# Patient Record
Sex: FEMALE | Race: WHITE | NOT HISPANIC OR LATINO | URBAN - METROPOLITAN AREA
[De-identification: names, ages, dates, MRNs, and addresses within clinical notes are randomized per-mention and may not be internally consistent; named-entity substitution may affect disease eponyms.]

---

## 2018-02-12 ENCOUNTER — INPATIENT (INPATIENT)
Facility: HOSPITAL | Age: 48
LOS: 3 days | Discharge: HOME | DRG: 472 | End: 2018-02-16
Attending: NEUROLOGICAL SURGERY | Admitting: NEUROLOGICAL SURGERY
Payer: COMMERCIAL

## 2018-02-12 ENCOUNTER — APPOINTMENT (OUTPATIENT)
Dept: SPINE | Facility: CLINIC | Age: 48
End: 2018-02-12
Payer: OTHER MISCELLANEOUS

## 2018-02-12 VITALS
OXYGEN SATURATION: 95 % | HEIGHT: 61 IN | SYSTOLIC BLOOD PRESSURE: 130 MMHG | WEIGHT: 160.06 LBS | HEART RATE: 95 BPM | RESPIRATION RATE: 16 BRPM | DIASTOLIC BLOOD PRESSURE: 94 MMHG | TEMPERATURE: 98 F

## 2018-02-12 VITALS
SYSTOLIC BLOOD PRESSURE: 133 MMHG | BODY MASS INDEX: 30.21 KG/M2 | WEIGHT: 160 LBS | HEIGHT: 61 IN | DIASTOLIC BLOOD PRESSURE: 89 MMHG | HEART RATE: 91 BPM

## 2018-02-12 DIAGNOSIS — G95.9 DISEASE OF SPINAL CORD, UNSPECIFIED: ICD-10-CM

## 2018-02-12 DIAGNOSIS — M54.10 RADICULOPATHY, SITE UNSPECIFIED: ICD-10-CM

## 2018-02-12 DIAGNOSIS — J84.116 CRYPTOGENIC ORGANIZING PNEUMONIA: ICD-10-CM

## 2018-02-12 LAB
ALBUMIN SERPL ELPH-MCNC: 3.8 G/DL — SIGNIFICANT CHANGE UP (ref 3.3–5)
ALP SERPL-CCNC: 46 U/L — SIGNIFICANT CHANGE UP (ref 40–120)
ALT FLD-CCNC: 10 U/L RC — SIGNIFICANT CHANGE UP (ref 10–45)
ANION GAP SERPL CALC-SCNC: 14 MMOL/L — SIGNIFICANT CHANGE UP (ref 5–17)
APTT BLD: 27.8 SEC — SIGNIFICANT CHANGE UP (ref 27.5–37.4)
AST SERPL-CCNC: 12 U/L — SIGNIFICANT CHANGE UP (ref 10–40)
BASOPHILS # BLD AUTO: 0.1 K/UL — SIGNIFICANT CHANGE UP (ref 0–0.2)
BASOPHILS NFR BLD AUTO: 0.5 % — SIGNIFICANT CHANGE UP (ref 0–2)
BILIRUB SERPL-MCNC: 0.1 MG/DL — LOW (ref 0.2–1.2)
BUN SERPL-MCNC: 17 MG/DL — SIGNIFICANT CHANGE UP (ref 7–23)
CALCIUM SERPL-MCNC: 9.3 MG/DL — SIGNIFICANT CHANGE UP (ref 8.4–10.5)
CHLORIDE SERPL-SCNC: 101 MMOL/L — SIGNIFICANT CHANGE UP (ref 96–108)
CO2 SERPL-SCNC: 24 MMOL/L — SIGNIFICANT CHANGE UP (ref 22–31)
CREAT SERPL-MCNC: 0.73 MG/DL — SIGNIFICANT CHANGE UP (ref 0.5–1.3)
ELLIPTOCYTES BLD QL SMEAR: SLIGHT — SIGNIFICANT CHANGE UP
EOSINOPHIL # BLD AUTO: 0.1 K/UL — SIGNIFICANT CHANGE UP (ref 0–0.5)
EOSINOPHIL NFR BLD AUTO: 0.6 % — SIGNIFICANT CHANGE UP (ref 0–6)
GLUCOSE SERPL-MCNC: 94 MG/DL — SIGNIFICANT CHANGE UP (ref 70–99)
HCT VFR BLD CALC: 36.7 % — SIGNIFICANT CHANGE UP (ref 34.5–45)
HGB BLD-MCNC: 12.3 G/DL — SIGNIFICANT CHANGE UP (ref 11.5–15.5)
INR BLD: 0.92 RATIO — SIGNIFICANT CHANGE UP (ref 0.88–1.16)
LYMPHOCYTES # BLD AUTO: 24.3 % — SIGNIFICANT CHANGE UP (ref 13–44)
LYMPHOCYTES # BLD AUTO: 3.9 K/UL — HIGH (ref 1–3.3)
MACROCYTES BLD QL: SIGNIFICANT CHANGE UP
MCHC RBC-ENTMCNC: 33.6 GM/DL — SIGNIFICANT CHANGE UP (ref 32–36)
MCHC RBC-ENTMCNC: 38.5 PG — HIGH (ref 27–34)
MCV RBC AUTO: 114 FL — HIGH (ref 80–100)
MONOCYTES # BLD AUTO: 1 K/UL — HIGH (ref 0–0.9)
MONOCYTES NFR BLD AUTO: 6.1 % — SIGNIFICANT CHANGE UP (ref 2–14)
NEUTROPHILS # BLD AUTO: 11.1 K/UL — HIGH (ref 1.8–7.4)
NEUTROPHILS NFR BLD AUTO: 68.5 % — SIGNIFICANT CHANGE UP (ref 43–77)
OVALOCYTES BLD QL SMEAR: SLIGHT — SIGNIFICANT CHANGE UP
PLAT MORPH BLD: NORMAL — SIGNIFICANT CHANGE UP
PLATELET # BLD AUTO: 365 K/UL — SIGNIFICANT CHANGE UP (ref 150–400)
POIKILOCYTOSIS BLD QL AUTO: SIGNIFICANT CHANGE UP
POTASSIUM SERPL-MCNC: 4 MMOL/L — SIGNIFICANT CHANGE UP (ref 3.5–5.3)
POTASSIUM SERPL-SCNC: 4 MMOL/L — SIGNIFICANT CHANGE UP (ref 3.5–5.3)
PROT SERPL-MCNC: 6.8 G/DL — SIGNIFICANT CHANGE UP (ref 6–8.3)
PROTHROM AB SERPL-ACNC: 9.9 SEC — SIGNIFICANT CHANGE UP (ref 9.8–12.7)
RBC # BLD: 3.21 M/UL — LOW (ref 3.8–5.2)
RBC # FLD: 13.4 % — SIGNIFICANT CHANGE UP (ref 10.3–14.5)
RBC BLD AUTO: ABNORMAL
SCHISTOCYTES BLD QL AUTO: SLIGHT — SIGNIFICANT CHANGE UP
SODIUM SERPL-SCNC: 139 MMOL/L — SIGNIFICANT CHANGE UP (ref 135–145)
WBC # BLD: 16.2 K/UL — HIGH (ref 3.8–10.5)
WBC # FLD AUTO: 16.2 K/UL — HIGH (ref 3.8–10.5)

## 2018-02-12 PROCEDURE — 99285 EMERGENCY DEPT VISIT HI MDM: CPT

## 2018-02-12 PROCEDURE — 99205 OFFICE O/P NEW HI 60 MIN: CPT

## 2018-02-12 PROCEDURE — 72125 CT NECK SPINE W/O DYE: CPT | Mod: 26

## 2018-02-12 RX ORDER — NORTRIPTYLINE HYDROCHLORIDE 10 MG/1
50 CAPSULE ORAL DAILY
Qty: 0 | Refills: 0 | Status: DISCONTINUED | OUTPATIENT
Start: 2018-02-12 | End: 2018-02-14

## 2018-02-12 RX ORDER — SODIUM CHLORIDE 9 MG/ML
1000 INJECTION INTRAMUSCULAR; INTRAVENOUS; SUBCUTANEOUS ONCE
Qty: 0 | Refills: 0 | Status: COMPLETED | OUTPATIENT
Start: 2018-02-12 | End: 2018-02-12

## 2018-02-12 RX ORDER — ACETAMINOPHEN 500 MG
650 TABLET ORAL EVERY 6 HOURS
Qty: 0 | Refills: 0 | Status: DISCONTINUED | OUTPATIENT
Start: 2018-02-12 | End: 2018-02-14

## 2018-02-12 RX ORDER — FLUOXETINE HCL 10 MG
40 CAPSULE ORAL DAILY
Qty: 0 | Refills: 0 | Status: DISCONTINUED | OUTPATIENT
Start: 2018-02-12 | End: 2018-02-14

## 2018-02-12 RX ORDER — ALPRAZOLAM 0.25 MG
1 TABLET ORAL
Qty: 0 | Refills: 0 | COMMUNITY

## 2018-02-12 RX ORDER — NORTRIPTYLINE HYDROCHLORIDE 10 MG/1
40 CAPSULE ORAL
Qty: 0 | Refills: 0 | COMMUNITY

## 2018-02-12 RX ORDER — PROPRANOLOL HCL 160 MG
60 CAPSULE, EXTENDED RELEASE 24HR ORAL DAILY
Qty: 0 | Refills: 0 | Status: DISCONTINUED | OUTPATIENT
Start: 2018-02-12 | End: 2018-02-14

## 2018-02-12 RX ORDER — PROPRANOLOL HCL 160 MG
1 CAPSULE, EXTENDED RELEASE 24HR ORAL
Qty: 0 | Refills: 0 | COMMUNITY

## 2018-02-12 RX ORDER — PREGABALIN 100 MG/1
100 CAPSULE ORAL
Refills: 0 | Status: ACTIVE | COMMUNITY

## 2018-02-12 RX ORDER — INFLUENZA VIRUS VACCINE 15; 15; 15; 15 UG/.5ML; UG/.5ML; UG/.5ML; UG/.5ML
0.5 SUSPENSION INTRAMUSCULAR ONCE
Qty: 0 | Refills: 0 | Status: DISCONTINUED | OUTPATIENT
Start: 2018-02-12 | End: 2018-02-16

## 2018-02-12 RX ORDER — HYDROCODONE BITARTRATE AND ACETAMINOPHEN 10; 325 MG/1; MG/1
10-325 TABLET ORAL
Refills: 0 | Status: ACTIVE | COMMUNITY

## 2018-02-12 RX ORDER — HYDROMORPHONE HYDROCHLORIDE 2 MG/ML
2 INJECTION INTRAMUSCULAR; INTRAVENOUS; SUBCUTANEOUS EVERY 4 HOURS
Qty: 0 | Refills: 0 | Status: DISCONTINUED | OUTPATIENT
Start: 2018-02-12 | End: 2018-02-14

## 2018-02-12 RX ORDER — OXYCODONE HYDROCHLORIDE 5 MG/1
10 TABLET ORAL EVERY 4 HOURS
Qty: 0 | Refills: 0 | Status: DISCONTINUED | OUTPATIENT
Start: 2018-02-12 | End: 2018-02-14

## 2018-02-12 RX ORDER — MORPHINE SULFATE 50 MG/1
4 CAPSULE, EXTENDED RELEASE ORAL ONCE
Qty: 0 | Refills: 0 | Status: DISCONTINUED | OUTPATIENT
Start: 2018-02-12 | End: 2018-02-12

## 2018-02-12 RX ORDER — PROPRANOLOL HCL 160 MG
0 CAPSULE, EXTENDED RELEASE 24HR ORAL
Qty: 0 | Refills: 0 | COMMUNITY

## 2018-02-12 RX ORDER — SENNA PLUS 8.6 MG/1
2 TABLET ORAL AT BEDTIME
Qty: 0 | Refills: 0 | Status: DISCONTINUED | OUTPATIENT
Start: 2018-02-12 | End: 2018-02-14

## 2018-02-12 RX ORDER — PROPRANOLOL HYDROCHLORIDE 60 MG/1
60 TABLET ORAL
Refills: 0 | Status: ACTIVE | COMMUNITY

## 2018-02-12 RX ORDER — NORTRIPTYLINE HYDROCHLORIDE 10 MG/1
10 CAPSULE ORAL
Refills: 0 | Status: ACTIVE | COMMUNITY

## 2018-02-12 RX ORDER — HYDROMORPHONE HYDROCHLORIDE 2 MG/ML
0.5 INJECTION INTRAMUSCULAR; INTRAVENOUS; SUBCUTANEOUS EVERY 4 HOURS
Qty: 0 | Refills: 0 | Status: DISCONTINUED | OUTPATIENT
Start: 2018-02-12 | End: 2018-02-14

## 2018-02-12 RX ORDER — FLUOXETINE HCL 10 MG
1 CAPSULE ORAL
Qty: 0 | Refills: 0 | COMMUNITY

## 2018-02-12 RX ORDER — ALPRAZOLAM 0.25 MG
0.25 TABLET ORAL
Qty: 0 | Refills: 0 | Status: DISCONTINUED | OUTPATIENT
Start: 2018-02-12 | End: 2018-02-14

## 2018-02-12 RX ORDER — ONDANSETRON 8 MG/1
4 TABLET, FILM COATED ORAL EVERY 6 HOURS
Qty: 0 | Refills: 0 | Status: DISCONTINUED | OUTPATIENT
Start: 2018-02-12 | End: 2018-02-14

## 2018-02-12 RX ADMIN — Medication 20 MILLIGRAM(S): at 19:52

## 2018-02-12 RX ADMIN — MORPHINE SULFATE 4 MILLIGRAM(S): 50 CAPSULE, EXTENDED RELEASE ORAL at 18:34

## 2018-02-12 RX ADMIN — Medication 0.25 MILLIGRAM(S): at 19:52

## 2018-02-12 RX ADMIN — HYDROMORPHONE HYDROCHLORIDE 2 MILLIGRAM(S): 2 INJECTION INTRAMUSCULAR; INTRAVENOUS; SUBCUTANEOUS at 23:42

## 2018-02-12 RX ADMIN — Medication 100 MILLIGRAM(S): at 19:52

## 2018-02-12 RX ADMIN — SODIUM CHLORIDE 2000 MILLILITER(S): 9 INJECTION INTRAMUSCULAR; INTRAVENOUS; SUBCUTANEOUS at 18:34

## 2018-02-12 NOTE — H&P ADULT - HISTORY OF PRESENT ILLNESS
p ([id number])     HPI: 49 yo female with 2 week history of significant LUE radiculopathy and numbness.  Denies any recent trauma, weakness. PMH positive for cryptogenic organizing PNA. MRI showed disc herniation from C3-4 to C6-C7.    PAST MEDICAL HISTORY   Cryptogenic organizing pneumonia  Herniated disc, cervical    PAST SURGICAL HISTORY         MEDICATIONS:  Antibiotics:    Neuro:  morphine  - Injectable 4 milliGRAM(s) IV Push Once    Anticoagulation:    Other:  sodium chloride 0.9% Bolus 1000 milliLiter(s) IV Bolus once      SOCIAL HISTORY:   Occupation:   Marital Status:     FAMILY HISTORY:      REVIEW OF SYSTEMS:  Check here if all are normal other than Neurological [x]  General:  Eyes:  ENT:  Cardiac:  Respiratory:  GI:  Musculoskeletal:   Skin:  Neurologic:   Psychiatric:     PHYSICAL EXAMINATION:   T(C): 36.7 (02-12-18 @ 15:01), Max: 36.7 (02-12-18 @ 15:01)  HR: 95 (02-12-18 @ 15:01) (95 - 95)  BP: 130/94 (02-12-18 @ 15:01) (130/94 - 130/94)  RR: 16 (02-12-18 @ 15:01) (16 - 16)  SpO2: 95% (02-12-18 @ 15:01) (95% - 95%)  Wt(kg): --Height (cm): 154.94 (02-12 @ 15:01)  Weight (kg): 72.6 (02-12 @ 15:01)    General Examination:     Neurologic Examination:               LABS:                RADIOLOGY & ADDITIONAL STUDIES:

## 2018-02-12 NOTE — ED PROVIDER NOTE - MEDICAL DECISION MAKING DETAILS
Patient presents w neck pain and paresthesia. Pt w decreased sensation on exam. Seen by Neurosurgery discussed surgical options. Pain control at this time and follow up recs.

## 2018-02-12 NOTE — H&P ADULT - ASSESSMENT
49 yo female with LUE radiculopathy for the past 2 weeks.  MRI showed disc herniation from C3-4 to C6-C7.    -admit to [place]  -ct c spine

## 2018-02-12 NOTE — ED PROVIDER NOTE - OBJECTIVE STATEMENT
49 yo f with pmhx of cryptogenic organizing pneumonia and herniated disc presents with "excruciating" neck pain and numbness to fingers on left hand, as well as headache. Pt was at [facility] last Monday where she got an MRI that showed 4 bulging discs. Denies any incontinence. 49 yo f with pmhx of cryptogenic organizing pneumonia and herniated disc presents with "excruciating" neck pain and numbness to fingers on left hand, as well as headache. Denies any trauma, states she woke up w symptoms not sure if she slept a certain way. Pt was at [facility] [place] where she got an MRI that showed 4 bulging discs and offered surgery but pt declined and wanted to follow up with [facility] physicians. Pt saw Dr. Lowe today who also discussed surgery w her but the pain was so severe she came into the ED. Denies any weakness or change in bladder/bowel, incontinence. no fever or chills.

## 2018-02-12 NOTE — ED ADULT NURSE NOTE - OBJECTIVE STATEMENT
48y f pt c/o "excruciating" neck pain and numbness to all fingers on left hand; pt states fell asleep about 2 weeks ago and woke with this; pt went to [facility] last week; was given an mri and dx with 4 cervical bulging discs; pt stated left hospital and met with md regarding operating on discs; pt also c/o headache; aox3; no resp distress; pt has hx of trach, healed; no chest pain; no abd pain; no n/v/d; no fever/chills; no incontinence; no diff ambulating; no other visible neuro deficits; skin warm dry intact; iv placed; labs drawn; pt medicated per md order; safety/comfort maintained; spouse at bedside

## 2018-02-13 DIAGNOSIS — F32.9 MAJOR DEPRESSIVE DISORDER, SINGLE EPISODE, UNSPECIFIED: ICD-10-CM

## 2018-02-13 DIAGNOSIS — M54.12 RADICULOPATHY, CERVICAL REGION: ICD-10-CM

## 2018-02-13 DIAGNOSIS — J84.10 PULMONARY FIBROSIS, UNSPECIFIED: ICD-10-CM

## 2018-02-13 DIAGNOSIS — J84.9 INTERSTITIAL PULMONARY DISEASE, UNSPECIFIED: ICD-10-CM

## 2018-02-13 DIAGNOSIS — J98.4 OTHER DISORDERS OF LUNG: ICD-10-CM

## 2018-02-13 DIAGNOSIS — J84.116 CRYPTOGENIC ORGANIZING PNEUMONIA: ICD-10-CM

## 2018-02-13 DIAGNOSIS — Z29.9 ENCOUNTER FOR PROPHYLACTIC MEASURES, UNSPECIFIED: ICD-10-CM

## 2018-02-13 DIAGNOSIS — D75.89 OTHER SPECIFIED DISEASES OF BLOOD AND BLOOD-FORMING ORGANS: ICD-10-CM

## 2018-02-13 LAB
GAS PNL BLDA: SIGNIFICANT CHANGE UP
HCG SERPL-ACNC: <2 MIU/ML — LOW (ref 5–24)
HCT VFR BLD CALC: 35.9 % — SIGNIFICANT CHANGE UP (ref 34.5–45)
HGB BLD-MCNC: 11.7 G/DL — SIGNIFICANT CHANGE UP (ref 11.5–15.5)
MCHC RBC-ENTMCNC: 32.6 GM/DL — SIGNIFICANT CHANGE UP (ref 32–36)
MCHC RBC-ENTMCNC: 37 PG — HIGH (ref 27–34)
MCV RBC AUTO: 113.6 FL — HIGH (ref 80–100)
PLATELET # BLD AUTO: 394 K/UL — SIGNIFICANT CHANGE UP (ref 150–400)
RBC # BLD: 3.16 M/UL — LOW (ref 3.8–5.2)
RBC # FLD: 15.1 % — HIGH (ref 10.3–14.5)
RHEUMATOID FACT SERPL-ACNC: <7 IU/ML — SIGNIFICANT CHANGE UP (ref 0–13.9)
WBC # BLD: 13.47 K/UL — HIGH (ref 3.8–10.5)
WBC # FLD AUTO: 13.47 K/UL — HIGH (ref 3.8–10.5)

## 2018-02-13 PROCEDURE — 99233 SBSQ HOSP IP/OBS HIGH 50: CPT | Mod: 57

## 2018-02-13 PROCEDURE — 99223 1ST HOSP IP/OBS HIGH 75: CPT

## 2018-02-13 PROCEDURE — 71250 CT THORAX DX C-: CPT | Mod: 26

## 2018-02-13 PROCEDURE — 93306 TTE W/DOPPLER COMPLETE: CPT | Mod: 26

## 2018-02-13 RX ORDER — CYCLOBENZAPRINE HYDROCHLORIDE 10 MG/1
10 TABLET, FILM COATED ORAL THREE TIMES A DAY
Qty: 0 | Refills: 0 | Status: DISCONTINUED | OUTPATIENT
Start: 2018-02-13 | End: 2018-02-14

## 2018-02-13 RX ORDER — IPRATROPIUM/ALBUTEROL SULFATE 18-103MCG
3 AEROSOL WITH ADAPTER (GRAM) INHALATION EVERY 6 HOURS
Qty: 0 | Refills: 0 | Status: DISCONTINUED | OUTPATIENT
Start: 2018-02-13 | End: 2018-02-14

## 2018-02-13 RX ORDER — HYDROMORPHONE HYDROCHLORIDE 2 MG/ML
0.5 INJECTION INTRAMUSCULAR; INTRAVENOUS; SUBCUTANEOUS ONCE
Qty: 0 | Refills: 0 | Status: DISCONTINUED | OUTPATIENT
Start: 2018-02-13 | End: 2018-02-14

## 2018-02-13 RX ADMIN — HYDROMORPHONE HYDROCHLORIDE 0.5 MILLIGRAM(S): 2 INJECTION INTRAMUSCULAR; INTRAVENOUS; SUBCUTANEOUS at 08:51

## 2018-02-13 RX ADMIN — Medication 40 MILLIGRAM(S): at 11:15

## 2018-02-13 RX ADMIN — HYDROMORPHONE HYDROCHLORIDE 2 MILLIGRAM(S): 2 INJECTION INTRAMUSCULAR; INTRAVENOUS; SUBCUTANEOUS at 20:50

## 2018-02-13 RX ADMIN — HYDROMORPHONE HYDROCHLORIDE 0.5 MILLIGRAM(S): 2 INJECTION INTRAMUSCULAR; INTRAVENOUS; SUBCUTANEOUS at 15:24

## 2018-02-13 RX ADMIN — NORTRIPTYLINE HYDROCHLORIDE 50 MILLIGRAM(S): 10 CAPSULE ORAL at 06:53

## 2018-02-13 RX ADMIN — Medication 3 MILLILITER(S): at 17:33

## 2018-02-13 RX ADMIN — Medication 10 MILLIGRAM(S): at 17:29

## 2018-02-13 RX ADMIN — OXYCODONE HYDROCHLORIDE 10 MILLIGRAM(S): 5 TABLET ORAL at 16:13

## 2018-02-13 RX ADMIN — OXYCODONE HYDROCHLORIDE 10 MILLIGRAM(S): 5 TABLET ORAL at 11:15

## 2018-02-13 RX ADMIN — Medication 60 MILLIGRAM(S): at 11:14

## 2018-02-13 RX ADMIN — Medication 100 MILLIGRAM(S): at 17:29

## 2018-02-13 RX ADMIN — Medication 0.25 MILLIGRAM(S): at 06:53

## 2018-02-13 RX ADMIN — HYDROMORPHONE HYDROCHLORIDE 0.5 MILLIGRAM(S): 2 INJECTION INTRAMUSCULAR; INTRAVENOUS; SUBCUTANEOUS at 09:05

## 2018-02-13 RX ADMIN — CYCLOBENZAPRINE HYDROCHLORIDE 10 MILLIGRAM(S): 10 TABLET, FILM COATED ORAL at 17:34

## 2018-02-13 RX ADMIN — HYDROMORPHONE HYDROCHLORIDE 0.5 MILLIGRAM(S): 2 INJECTION INTRAMUSCULAR; INTRAVENOUS; SUBCUTANEOUS at 23:20

## 2018-02-13 RX ADMIN — Medication 100 MILLIGRAM(S): at 06:54

## 2018-02-13 RX ADMIN — OXYCODONE HYDROCHLORIDE 10 MILLIGRAM(S): 5 TABLET ORAL at 11:45

## 2018-02-13 RX ADMIN — OXYCODONE HYDROCHLORIDE 10 MILLIGRAM(S): 5 TABLET ORAL at 04:41

## 2018-02-13 RX ADMIN — HYDROMORPHONE HYDROCHLORIDE 2 MILLIGRAM(S): 2 INJECTION INTRAMUSCULAR; INTRAVENOUS; SUBCUTANEOUS at 00:58

## 2018-02-13 RX ADMIN — HYDROMORPHONE HYDROCHLORIDE 0.5 MILLIGRAM(S): 2 INJECTION INTRAMUSCULAR; INTRAVENOUS; SUBCUTANEOUS at 15:40

## 2018-02-13 RX ADMIN — Medication 0.25 MILLIGRAM(S): at 17:30

## 2018-02-13 RX ADMIN — HYDROMORPHONE HYDROCHLORIDE 0.5 MILLIGRAM(S): 2 INJECTION INTRAMUSCULAR; INTRAVENOUS; SUBCUTANEOUS at 01:44

## 2018-02-13 RX ADMIN — HYDROMORPHONE HYDROCHLORIDE 0.5 MILLIGRAM(S): 2 INJECTION INTRAMUSCULAR; INTRAVENOUS; SUBCUTANEOUS at 22:55

## 2018-02-13 RX ADMIN — Medication 10 MILLIGRAM(S): at 11:14

## 2018-02-13 RX ADMIN — HYDROMORPHONE HYDROCHLORIDE 0.5 MILLIGRAM(S): 2 INJECTION INTRAMUSCULAR; INTRAVENOUS; SUBCUTANEOUS at 03:03

## 2018-02-13 RX ADMIN — HYDROMORPHONE HYDROCHLORIDE 2 MILLIGRAM(S): 2 INJECTION INTRAMUSCULAR; INTRAVENOUS; SUBCUTANEOUS at 21:30

## 2018-02-13 RX ADMIN — OXYCODONE HYDROCHLORIDE 10 MILLIGRAM(S): 5 TABLET ORAL at 07:12

## 2018-02-13 NOTE — CONSULT NOTE ADULT - SUBJECTIVE AND OBJECTIVE BOX
Dr Diony Vazquez     PMD: Hernandez Hopkins    Patient is a 48y old  Female who presents with a chief complaint of LUE radiculopathy (12 Feb 2018 18:11)      HPI:  49 yo female with 2 week history of significant LUE radiculopathy and numbness.  Denies any recent trauma, weakness. PMH positive for cryptogenic organizing PNA. MRI showed disc herniation from C3-4 to C6-C7.      History limited due to: [ ] Dementia  [ ] Delirium  [ ] Condition    Pain Symptoms if applicable:             	                         none	   mild         moderate         severe  Pain:	                            0	    1-3	     4-6	         7-10  Location:	  Modifying factors:	  Associated symptoms:	    Function: [ ] Independent  [ ] Assistance  [ ] Total care  [ ] Non-ambulatory  On antiplatelet or anticoagulation? [ ] YES [ ] NO  Prior anesthesia:  DASI: METS:       Allergies    No Known Allergies    Intolerances      HOME MEDICATIONS: [ ] Reviewed  Home Medications:  Lyrica: 100 milligram(s) orally 3 times a day (12 Feb 2018 20:06)  Lyrica:  (12 Feb 2018 20:06)  Norco:  (12 Feb 2018 20:06)  Norco 10 mg-325 mg oral tablet: 1 tab(s) orally every 6 hours (12 Feb 2018 20:06)  nortriptyline:  (12 Feb 2018 20:06)  nortriptyline: 40 milligram(s) orally once a day (12 Feb 2018 20:06)  predniSONE:  (12 Feb 2018 20:06)  predniSONE 20 mg oral tablet: 1 tab(s) orally once a day (12 Feb 2018 20:06)  propranolol:  (12 Feb 2018 20:06)  propranolol 60 mg oral capsule, extended release: 1 cap(s) orally once a day (12 Feb 2018 20:06)  PROzac:  (12 Feb 2018 20:06)  PROzac 40 mg oral capsule: 1 cap(s) orally once a day (12 Feb 2018 20:06)  Soma: 350 milligram(s) orally 2 times a day (12 Feb 2018 20:06)  Soma:  (12 Feb 2018 20:06)  Xanax:  (12 Feb 2018 20:06)  Xanax 0.25 mg oral tablet: 1 cap(s) orally 2 times a day (12 Feb 2018 20:06)      MEDICATIONS  (STANDING):  ALPRAZolam 0.25 milliGRAM(s) Oral two times a day  FLUoxetine 40 milliGRAM(s) Oral daily  influenza   Vaccine 0.5 milliLiter(s) IntraMuscular once  nortriptyline 50 milliGRAM(s) Oral daily  predniSONE   Tablet 10 milliGRAM(s) Oral two times a day  pregabalin 100 milliGRAM(s) Oral three times a day  propranolol LA 60 milliGRAM(s) Oral daily    MEDICATIONS  (PRN):  acetaminophen   Tablet. 650 milliGRAM(s) Oral every 6 hours PRN Mild Pain (1 - 3)  bisacodyl 5 milliGRAM(s) Oral daily PRN Constipation  cyclobenzaprine 10 milliGRAM(s) Oral three times a day PRN Muscle Spasm  HYDROmorphone   Tablet 2 milliGRAM(s) Oral every 4 hours PRN Severe Pain (7 - 10)  HYDROmorphone  Injectable 0.5 milliGRAM(s) IV Push every 4 hours PRN breakthrough  ondansetron   Disintegrating Tablet 4 milliGRAM(s) Oral every 6 hours PRN Nausea  oxyCODONE    IR 10 milliGRAM(s) Oral every 4 hours PRN Moderate Pain (4 - 6)  senna 2 Tablet(s) Oral at bedtime PRN Constipation      PAST MEDICAL & SURGICAL HISTORY:  Cryptogenic organizing pneumonia: chest tube/trach  Herniated disc, cervical  [ ] Reviewed     SOCIAL HISTORY:  Residence: [ ] [facility]  [ ] [facility]  [ ] Community  [ ] Substance abuse:   [ ] Tobacco:   [ ] Alcohol use:     FAMILY HISTORY:  [ ] No pertinent family history in first degree relatives     REVIEW OF SYSTEMS:    CONSTITUTIONAL: No fever, weight loss, or fatigue  EYES: No eye pain, visual disturbances, or discharge  ENMT:  No difficulty hearing, tinnitus, vertigo; No sinus or throat pain  NECK: No pain or stiffness  BREASTS: No pain, masses, or nipple discharge  RESPIRATORY: No cough, wheezing, chills or hemoptysis; No shortness of breath  CARDIOVASCULAR: No chest pain, palpitations, dizziness, or leg swelling  GASTROINTESTINAL: No abdominal or epigastric pain. No nausea, vomiting, or hematemesis; No diarrhea or constipation. No melena or hematochezia.  GENITOURINARY: No dysuria, frequency, hematuria, or incontinence  NEUROLOGICAL: No headaches, memory loss, loss of strength, numbness, or tremors  SKIN: No itching, burning, rashes, or lesions   LYMPH NODES: No enlarged glands  ENDOCRINE: No heat or cold intolerance; No hair loss  MUSCULOSKELETAL: No muscle or back pain  PSYCHIATRIC: No depression, anxiety, mood swings, or difficulty sleeping  HEME/LYMPH: No easy bruising, or bleeding gums  ALLERGY AND IMMUNOLOGIC: No hives or eczema    [  ] All other ROS negative  [  ] Unable to obtain due to poor mental status    Vital Signs Last 24 Hrs  T(C): 36.7 (13 Feb 2018 06:54), Max: 36.8 (12 Feb 2018 17:30)  T(F): 98.1 (13 Feb 2018 06:54), Max: 98.2 (12 Feb 2018 17:30)  HR: 98 (13 Feb 2018 06:54) (85 - 98)  BP: 145/90 (13 Feb 2018 06:54) (130/94 - 159/92)  BP(mean): --  RR: 18 (13 Feb 2018 06:54) (16 - 18)  SpO2: 95% (13 Feb 2018 06:54) (95% - 97%)    PHYSICAL EXAM:    GENERAL: NAD, well-groomed, well-developed  HEAD:  Atraumatic, Normocephalic  EYES: EOMI, PERRLA, conjunctiva and sclera clear  ENMT: Moist mucous membranes  NECK: Supple, No JVD  RESPIRATORY: Clear to auscultation bilaterally; No rales, rhonchi, wheezing, or rubs  CARDIOVASCULAR: Regular rate and rhythm; No murmurs, rubs, or gallops  GASTROINTESTINAL: Soft, Nontender, Nondistended; Bowel sounds present  GENITOURINARY: Not examined  EXTREMITIES:  2+ Peripheral Pulses, No clubbing, cyanosis, or edema  NEURO:  Alert & Oriented X3; Moving all 4 extremities; No gross sensory deficits  HEME/LYMPH: No lymphadenopathy noted  SKIN: No rashes or lesions; Incisions C/D/I    LABS:                        12.3   16.2  )-----------( 365      ( 12 Feb 2018 18:33 )             36.7     02-12    139  |  101  |  17  ----------------------------<  94  4.0   |  24  |  0.73    Ca    9.3      12 Feb 2018 18:33    TPro  6.8  /  Alb  3.8  /  TBili  0.1<L>  /  DBili  x   /  AST  12  /  ALT  10  /  AlkPhos  46  02-12    PT/INR - ( 12 Feb 2018 18:33 )   PT: 9.9 sec;   INR: 0.92 ratio         PTT - ( 12 Feb 2018 18:33 )  PTT:27.8 sec    CAPILLARY BLOOD GLUCOSE            RADIOLOGY & ADDITIONAL STUDIES:    EKG:   Personally Reviewed:  [X] YES NSR somewhat low voltage no ischemic changes    Imaging:   Personally Reviewed:  [X] YES < from: CT Cervical Spine No Cont (02.12.18 @ 19:21) >  No evidence for acute fracture. Degenerative changes as described. MRI   may be obtained for further evaluation as warranted if there are no MRI   contraindications.    < end of copied text >                Consultant(s) notes reviewed:    Care Discussed with Consultant(s)/Other Providers:    Advanced Directives: [ ] DNR  [ ] No feeding tube  [ ] MOLST in chart  [ ] MOLST completed today  [ ] Unknown    [ ] Probable osteoporosis  [ ] Possible osteomalacia  [ ] Increased delirium risk  [ ] Delirium and other risks can be reduced by:          -early ambulation          -minimizing "tethers" - IV, oxygen, catheters, etc          -avoiding hypnotics and sedatives          -maintaining hydration/nutrition          -avoid anticholinergics - diphenhydramine, etc          -pain control          -supportive environment Dr Diony Vazquez     PMD: Hernandez Hopkins // [facility] - [place] ([facility]     Patient is a 48y old  Female who presents with a chief complaint of LUE radiculopathy (12 Feb 2018 18:11)    HPI:  48F with cryptogenic organizing pneumonia x 5 yrs w/multiple hospitalizations for pneumonia requiring weeks-long intubations most recently 12/2016, h/o trach s/p decannulation, on chronic steroids, chronic back pain p/w 2 week history of significant LUE radiculopathy and numbness.  Denies any recent trauma, weakness. MRI showed disc herniation from C3-4 to C6-C7.    Pain Symptoms if applicable:  Pain:	moderate  Location:	neck  Modifying factors:	extensive pain regimen w/inadequate relief. no dysphagia.   Associated symptoms:	radiation to L hand w/numbness    Function: [ ] Independent  [X] Assistance  [ ] Total care  [ ] Non-ambulatory  On antiplatelet or anticoagulation? [ ] YES [X] NO  Prior anesthesia: yes  METS: limited for last 5 yrs due to       Allergies    No Known Allergies    Intolerances      HOME MEDICATIONS: [X] Reviewed ISTOP reviewed [id number]  Home Medications:  Lyrica: 100 milligram(s) orally 3 times a day (12 Feb 2018 20:06)  Lyrica:  (12 Feb 2018 20:06)  Norco:  (12 Feb 2018 20:06)  Norco 10 mg-325 mg oral tablet: 1 tab(s) orally every 6 hours (12 Feb 2018 20:06)  nortriptyline:  (12 Feb 2018 20:06)  nortriptyline: 40 milligram(s) orally once a day (12 Feb 2018 20:06)  predniSONE:  (12 Feb 2018 20:06)  predniSONE 20 mg oral tablet: 1 tab(s) orally once a day (12 Feb 2018 20:06)  propranolol:  (12 Feb 2018 20:06)  propranolol 60 mg oral capsule, extended release: 1 cap(s) orally once a day (12 Feb 2018 20:06)  PROzac:  (12 Feb 2018 20:06)  PROzac 40 mg oral capsule: 1 cap(s) orally once a day (12 Feb 2018 20:06)  Soma: 350 milligram(s) orally 2 times a day (12 Feb 2018 20:06)  Soma:  (12 Feb 2018 20:06)  Xanax:  (12 Feb 2018 20:06)  Xanax 0.25 mg oral tablet: 1 cap(s) orally 2 times a day (12 Feb 2018 20:06)      MEDICATIONS  (STANDING):  ALPRAZolam 0.25 milliGRAM(s) Oral two times a day  FLUoxetine 40 milliGRAM(s) Oral daily  influenza   Vaccine 0.5 milliLiter(s) IntraMuscular once  nortriptyline 50 milliGRAM(s) Oral daily  predniSONE   Tablet 10 milliGRAM(s) Oral two times a day  pregabalin 100 milliGRAM(s) Oral three times a day  propranolol LA 60 milliGRAM(s) Oral daily    MEDICATIONS  (PRN):  acetaminophen   Tablet. 650 milliGRAM(s) Oral every 6 hours PRN Mild Pain (1 - 3)  bisacodyl 5 milliGRAM(s) Oral daily PRN Constipation  cyclobenzaprine 10 milliGRAM(s) Oral three times a day PRN Muscle Spasm  HYDROmorphone   Tablet 2 milliGRAM(s) Oral every 4 hours PRN Severe Pain (7 - 10)  HYDROmorphone  Injectable 0.5 milliGRAM(s) IV Push every 4 hours PRN breakthrough  ondansetron   Disintegrating Tablet 4 milliGRAM(s) Oral every 6 hours PRN Nausea  oxyCODONE    IR 10 milliGRAM(s) Oral every 4 hours PRN Moderate Pain (4 - 6)  senna 2 Tablet(s) Oral at bedtime PRN Constipation      PAST MEDICAL & SURGICAL HISTORY:  Cryptogenic organizing pneumonia: chest tube/trach  Herniated disc, cervical  [X] Reviewed     SOCIAL HISTORY:  Residence: [ ] [facility]  [ ] SNF  [X] Community  [X] Substance abuse: none  [X] Tobacco: none  [X] Alcohol use: none    FAMILY HISTORY:  Mother - Breast Cancer  Father - Heart disease    REVIEW OF SYSTEMS:    CONSTITUTIONAL: No fever, weight loss, or fatigue  EYES: No eye pain, visual disturbances, or discharge  ENMT: No difficulty hearing, tinnitus, vertigo; No sinus or throat pain  NECK: + pain, see above  RESPIRATORY: SOB at baseline  CARDIOVASCULAR: No chest pain, palpitations, dizziness, or leg swelling  GASTROINTESTINAL: No abdominal or epigastric pain. No nausea, vomiting, or hematemesis; No diarrhea or constipation. No melena or hematochezia.  GENITOURINARY: No dysuria, frequency, hematuria, or incontinence  NEUROLOGICAL: L arm numbness, no difficulty walking  SKIN: No itching, burning, rashes, or lesions   MUSCULOSKELETAL: + back pain  PSYCHIATRIC: No depression, anxiety, mood swings, or difficulty sleeping    [ X ] All other ROS negative  [  ] Unable to obtain due to poor mental status    Vital Signs Last 24 Hrs  T(C): 36.7 (13 Feb 2018 06:54), Max: 36.8 (12 Feb 2018 17:30)  T(F): 98.1 (13 Feb 2018 06:54), Max: 98.2 (12 Feb 2018 17:30)  HR: 98 (13 Feb 2018 06:54) (85 - 98)  BP: 145/90 (13 Feb 2018 06:54) (130/94 - 159/92)  BP(mean): --  RR: 18 (13 Feb 2018 06:54) (16 - 18)  SpO2: 95% (13 Feb 2018 06:54) (95% - 97%)    PHYSICAL EXAM:    GENERAL: NAD, well-groomed, well-developed  EYES: EOMI, PERRLA, conjunctiva and sclera clear  ENMT: Moist mucous membranes  NECK: Supple, No JVD. +trach scar  RESPIRATORY: Clear to auscultation bilaterally; No rales, rhonchi, wheezing, or rubs  CARDIOVASCULAR: Regular rate and rhythm; No murmurs, rubs, or gallops  GASTROINTESTINAL: Soft, Nontender, Nondistended; Bowel sounds present  EXTREMITIES:  2+ Peripheral Pulses, No clubbing, cyanosis, or edema  NEURO:  Alert & Oriented X3; Moving all 4 extremities; numbness L 4th/5th fingers, + L side pronator drift  HEME/LYMPH: No lymphadenopathy noted  SKIN: No rashes or lesions; Incisions C/D/I    LABS:                        12.3   16.2  )-----------( 365      ( 12 Feb 2018 18:33 )             36.7     02-12    139  |  101  |  17  ----------------------------<  94  4.0   |  24  |  0.73    Ca    9.3      12 Feb 2018 18:33    TPro  6.8  /  Alb  3.8  /  TBili  0.1<L>  /  DBili  x   /  AST  12  /  ALT  10  /  AlkPhos  46  02-12    PT/INR - ( 12 Feb 2018 18:33 )   PT: 9.9 sec;   INR: 0.92 ratio         PTT - ( 12 Feb 2018 18:33 )  PTT:27.8 sec    CAPILLARY BLOOD GLUCOSE            RADIOLOGY & ADDITIONAL STUDIES:    EKG:   Personally Reviewed:  [X] YES NSR somewhat low voltage no ischemic changes    Imaging:   Personally Reviewed:  [X] YES < from: CT Cervical Spine No Cont (02.12.18 @ 19:21) >  No evidence for acute fracture. Degenerative changes as described. MRI   may be obtained for further evaluation as warranted if there are no MRI   contraindications.    < end of copied text >                Consultant(s) notes reviewed:    Care Discussed with Consultant(s)/Other Providers:    Advanced Directives: [ ] DNR  [ ] No feeding tube  [ ] MOLST in chart  [ ] MOLST completed today  [ ] Unknown    [ ] Probable osteoporosis  [ ] Possible osteomalacia  [ ] Increased delirium risk  [ ] Delirium and other risks can be reduced by:          -early ambulation          -minimizing "tethers" - IV, oxygen, catheters, etc          -avoiding hypnotics and sedatives          -maintaining hydration/nutrition          -avoid anticholinergics - diphenhydramine, etc          -pain control          -supportive environment Dr Diony Vazquez     PMD: Hernandez Hopkins // [facility] [place]     Patient is a 48y old  Female who presents with a chief complaint of LUE radiculopathy (12 Feb 2018 18:11)    HPI:  48F with cryptogenic organizing pneumonia x 5 yrs w/multiple hospitalizations for pneumonia requiring weeks-long intubations most recently 12/2016, h/o trach s/p decannulation, on chronic steroids on home oxygen QHS, chronic back pain p/w 2 week history of significant LUE radiculopathy and numbness.  Denies any recent trauma, weakness. MRI showed disc herniation from C3-4 to C6-C7.   Pt notes she has frequent dyspnea with exertion. She is on propranolol because of tachycardia.     Pain Symptoms if applicable:  Pain:	moderate  Location:	neck  Modifying factors:	extensive pain regimen w/inadequate relief. no dysphagia.   Associated symptoms:	radiation to L hand w/numbness    Function: [ ] Independent  [X] Assistance  [ ] Total care  [ ] Non-ambulatory  On antiplatelet or anticoagulation? [ ] YES [X] NO  Prior anesthesia: yes  METS: limited for last 5 yrs due to       Allergies    No Known Allergies    Intolerances      HOME MEDICATIONS: [X] Reviewed ISTOP reviewed [id number]  Home Medications:  Lyrica: 100 milligram(s) orally 3 times a day (12 Feb 2018 20:06)  Lyrica:  (12 Feb 2018 20:06)  Norco:  (12 Feb 2018 20:06)  Norco 10 mg-325 mg oral tablet: 1 tab(s) orally every 6 hours (12 Feb 2018 20:06)  nortriptyline:  (12 Feb 2018 20:06)  nortriptyline: 40 milligram(s) orally once a day (12 Feb 2018 20:06)  predniSONE:  (12 Feb 2018 20:06)  predniSONE 20 mg oral tablet: 1 tab(s) orally once a day (12 Feb 2018 20:06)  propranolol:  (12 Feb 2018 20:06)  propranolol 60 mg oral capsule, extended release: 1 cap(s) orally once a day (12 Feb 2018 20:06)  PROzac:  (12 Feb 2018 20:06)  PROzac 40 mg oral capsule: 1 cap(s) orally once a day (12 Feb 2018 20:06)  Soma: 350 milligram(s) orally 2 times a day (12 Feb 2018 20:06)  Soma:  (12 Feb 2018 20:06)  Xanax:  (12 Feb 2018 20:06)  Xanax 0.25 mg oral tablet: 1 cap(s) orally 2 times a day (12 Feb 2018 20:06)      MEDICATIONS  (STANDING):  ALPRAZolam 0.25 milliGRAM(s) Oral two times a day  FLUoxetine 40 milliGRAM(s) Oral daily  influenza   Vaccine 0.5 milliLiter(s) IntraMuscular once  nortriptyline 50 milliGRAM(s) Oral daily  predniSONE   Tablet 10 milliGRAM(s) Oral two times a day  pregabalin 100 milliGRAM(s) Oral three times a day  propranolol LA 60 milliGRAM(s) Oral daily    MEDICATIONS  (PRN):  acetaminophen   Tablet. 650 milliGRAM(s) Oral every 6 hours PRN Mild Pain (1 - 3)  bisacodyl 5 milliGRAM(s) Oral daily PRN Constipation  cyclobenzaprine 10 milliGRAM(s) Oral three times a day PRN Muscle Spasm  HYDROmorphone   Tablet 2 milliGRAM(s) Oral every 4 hours PRN Severe Pain (7 - 10)  HYDROmorphone  Injectable 0.5 milliGRAM(s) IV Push every 4 hours PRN breakthrough  ondansetron   Disintegrating Tablet 4 milliGRAM(s) Oral every 6 hours PRN Nausea  oxyCODONE    IR 10 milliGRAM(s) Oral every 4 hours PRN Moderate Pain (4 - 6)  senna 2 Tablet(s) Oral at bedtime PRN Constipation      PAST MEDICAL & SURGICAL HISTORY:  Cryptogenic organizing pneumonia: chest tube/trach  Herniated disc, cervical  [X] Reviewed     SOCIAL HISTORY:  Residence: [ ] [facility]  [ ] [facility]  [X] Community  [X] Substance abuse: none  [X] Tobacco: none  [X] Alcohol use: none    FAMILY HISTORY:  Mother - Breast Cancer  Father - Heart disease    REVIEW OF SYSTEMS:    CONSTITUTIONAL: No fever, weight loss, or fatigue  EYES: No eye pain, visual disturbances, or discharge  ENMT: No difficulty hearing, tinnitus, vertigo; No sinus or throat pain  NECK: + pain, see above  RESPIRATORY: SOB at baseline, worsened with exertion  CARDIOVASCULAR: No chest pain, palpitations, dizziness, or leg swelling  GASTROINTESTINAL: No abdominal or epigastric pain. No nausea, vomiting, or hematemesis; No diarrhea or constipation. No melena or hematochezia.  GENITOURINARY: No dysuria, frequency, hematuria, or incontinence  NEUROLOGICAL: L arm numbness, no difficulty walking  SKIN: No itching, burning, rashes, or lesions   MUSCULOSKELETAL: + back pain  PSYCHIATRIC: No depression, anxiety, mood swings, or difficulty sleeping    [ X ] All other ROS negative  [  ] Unable to obtain due to poor mental status    Vital Signs Last 24 Hrs  T(C): 36.7 (13 Feb 2018 06:54), Max: 36.8 (12 Feb 2018 17:30)  T(F): 98.1 (13 Feb 2018 06:54), Max: 98.2 (12 Feb 2018 17:30)  HR: 98 (13 Feb 2018 06:54) (85 - 98)  BP: 145/90 (13 Feb 2018 06:54) (130/94 - 159/92)  BP(mean): --  RR: 18 (13 Feb 2018 06:54) (16 - 18)  SpO2: 95% (13 Feb 2018 06:54) (95% - 97%)    PHYSICAL EXAM:    GENERAL: NAD, well-groomed, well-developed  EYES: EOMI, PERRLA, conjunctiva and sclera clear  ENMT: Moist mucous membranes  NECK: Supple, No JVD. +trach scar  RESPIRATORY: Clear to auscultation bilaterally; No rales, rhonchi, wheezing, or rubs  CARDIOVASCULAR: Regular rate and rhythm; No murmurs, rubs, or gallops  GASTROINTESTINAL: Soft, Nontender, Nondistended; Bowel sounds present  EXTREMITIES:  2+ Peripheral Pulses, No clubbing, cyanosis, or edema  NEURO:  Alert & Oriented X3; Moving all 4 extremities; numbness L 4th/5th fingers, + L side pronator drift  SKIN: No rashes or lesions; Incisions C/D/I    LABS:                        12.3   16.2  )-----------( 365      ( 12 Feb 2018 18:33 )             36.7     02-12    139  |  101  |  17  ----------------------------<  94  4.0   |  24  |  0.73    Ca    9.3      12 Feb 2018 18:33    TPro  6.8  /  Alb  3.8  /  TBili  0.1<L>  /  DBili  x   /  AST  12  /  ALT  10  /  AlkPhos  46  02-12    PT/INR - ( 12 Feb 2018 18:33 )   PT: 9.9 sec;   INR: 0.92 ratio         PTT - ( 12 Feb 2018 18:33 )  PTT:27.8 sec    CAPILLARY BLOOD GLUCOSE    RADIOLOGY & ADDITIONAL STUDIES:    EKG:   Personally Reviewed:  [X] YES NSR somewhat low voltage no ischemic changes    Imaging:   Personally Reviewed:  [X] YES < from: CT Cervical Spine No Cont (02.12.18 @ 19:21) >  No evidence for acute fracture. Degenerative changes as described. MRI   may be obtained for further evaluation as warranted if there are no MRI   contraindications.    < end of copied text >                Consultant(s) notes reviewed:    Care Discussed with Consultant(s)/Other Providers:    [X] Probable osteoporosis  [X] Possible osteomalacia  [ ] Increased delirium risk  [ ] Delirium and other risks can be reduced by:          -early ambulation          -minimizing "tethers" - IV, oxygen, catheters, etc          -avoiding hypnotics and sedatives          -maintaining hydration/nutrition          -avoid anticholinergics - diphenhydramine, etc          -pain control          -supportive environment

## 2018-02-13 NOTE — CONSULT NOTE ADULT - PROBLEM SELECTOR RECOMMENDATION 2
?history of ILD/fibrosis as a result of ARDS  - would have resolved with steroids   -Needs CT chest non-contrast prior to surgery (ordered)  -Patient instructed to obtain disc from prior CT  -Check MYLES, RF  -Will need stress dose hydrocortisone intra-op  -Should be placed on PCP ppx bactrim DS 1tab TIW given chronic steroids PFT's from 1/18 consistent with restriction   DLCO 46%

## 2018-02-13 NOTE — PROGRESS NOTE ADULT - SUBJECTIVE AND OBJECTIVE BOX
CHIEF COMPLAINT: patient c/o neck pain and numbness in 4th and 5th digits of left hand.   reports pain meds working ok, anxious about surgery tomorrow; denies chest pains/SOB    Vital Signs Last 24 Hrs  T(C): 36.6 (13 Feb 2018 15:14), Max: 37.2 (13 Feb 2018 11:12)  T(F): 97.9 (13 Feb 2018 15:14), Max: 99 (13 Feb 2018 11:12)  HR: 82 (13 Feb 2018 16:12) (82 - 98)  BP: 126/79 (13 Feb 2018 16:12) (126/79 - 159/92)  BP(mean): --  RR: 18 (13 Feb 2018 16:12) (16 - 18)  SpO2: 95% (13 Feb 2018 16:12) (95% - 98%)    PHYSICAL EXAM:    Constitutional: No Acute Distress     Neurological: AOx3, Following Commands, Moving all Extremities     Motor exam:          Upper extremity                         Delt     Bicep     Tricep    HG                                                 R         5/5        5/5        5/5       5/5                                               L          5/5        5/5        4+/5       5-/5          Lower extremity                        HF         KF        KE       DF         PF                                                  R        5/5        5/5        5/5       5/5         5/5                                               L         5/5        5/5       5/5       5/5          5/5                                                 Sensation: [x] intact to light touch  [] decreased:     Pulmonary: Clear to Auscultation, No rales, No rhonchi, No wheezes     Cardiovascular: S1, S2, Regular rate and rhythm     Gastrointestinal: Soft, Non-tender, Non-distended     Extremities: No calf tenderness     LABS:                        12.3   16.2  )-----------( 365      ( 12 Feb 2018 18:33 )             36.7    02-12    139  |  101  |  17  ----------------------------<  94  4.0   |  24  |  0.73    Ca    9.3      12 Feb 2018 18:33    TPro  6.8  /  Alb  3.8  /  TBili  0.1<L>  /  DBili  x   /  AST  12  /  ALT  10  /  AlkPhos  46  02-12  PT/INR - ( 12 Feb 2018 18:33 )   PT: 9.9 sec;   INR: 0.92 ratio         PTT - ( 12 Feb 2018 18:33 )  PTT:27.8 sec    MEDICATIONS:  Neuro:  acetaminophen   Tablet. 650 milliGRAM(s) Oral every 6 hours PRN Mild Pain (1 - 3)  ALPRAZolam 0.25 milliGRAM(s) Oral two times a day  cyclobenzaprine 10 milliGRAM(s) Oral three times a day PRN Muscle Spasm  FLUoxetine 40 milliGRAM(s) Oral daily  HYDROmorphone   Tablet 2 milliGRAM(s) Oral every 4 hours PRN Severe Pain (7 - 10)  HYDROmorphone  Injectable 0.5 milliGRAM(s) IV Push every 4 hours PRN breakthrough  nortriptyline 50 milliGRAM(s) Oral daily  ondansetron   Disintegrating Tablet 4 milliGRAM(s) Oral every 6 hours PRN Nausea  oxyCODONE    IR 10 milliGRAM(s) Oral every 4 hours PRN Moderate Pain (4 - 6)  pregabalin 100 milliGRAM(s) Oral three times a day    Cardiac:  propranolol LA 60 milliGRAM(s) Oral daily    Pulm:  ALBUTerol/ipratropium for Nebulization 3 milliLiter(s) Nebulizer every 6 hours    GI/:  bisacodyl 5 milliGRAM(s) Oral daily PRN Constipation  senna 2 Tablet(s) Oral at bedtime PRN Constipation    Other:   influenza   Vaccine 0.5 milliLiter(s) IntraMuscular once  predniSONE   Tablet 10 milliGRAM(s) Oral two times a day    DIET: [x] Regular [] CCD [] Renal [] Puree [] Dysphagia [] Tube Feeds:   NPO at midnight except meds, for OR tomorrow    IMAGING:   < from: CT Cervical Spine No Cont (02.12.18 @ 19:21) >  INTERPRETATION:  CLINICAL INFORMATION: Neck pain with left hand   paresthesia.    COMPARISON: None available.    TECHNIQUE: Multiple axial CT images of the cervical spine were obtained   without the administration of intravenous contrast. Sagittal and coronal   reformatted images were also reviewed.    FINDINGS:    There is no acute displaced cervical spine fracture or evidence of   traumatic malalignment. Reversal of the normal cervical lordosis which   may be secondary to muscle spasm or patient positioning.     There are multilevel degenerative changes characterized by disc   osteophyte complexes, disc bulges, and facet and uncinate hypertrophy.   This results in multilevel canal stenosis and multilevel neural foraminal   narrowing, the overall degree of which is not well demonstrated on this   exam.     The prevertebral soft tissues are not widened. The regional soft tissues   of the neck are unremarkable. The lung apices are clear.      IMPRESSION:     No evidence for acute fracture. Degenerative changes as described. MRI   may be obtained for further evaluation as warranted if there are no MRI   contraindications.  < end of copied text >    outside MRI showed C3/4- C6/7 disc herniation

## 2018-02-13 NOTE — CONSULT NOTE ADULT - SUBJECTIVE AND OBJECTIVE BOX
PULMONARY CONSULT    HPI: 47 y/o F with PMH of anxiety, s/p abdominoplasty, s/p cholecystectomy, former smoker (10 pack yrs), SBO (medically managed-2016), extensive pulmonary history starting with respiratory failure 2nd ARDS (8/2012) requiring tracheostomy with subsequent decannulation (10/2012), respiratory failure requiring intubation (2015 and 2016) subsequently diagnosed with  (12/2016) via RUL VATS has been on chronic prednisone since that time at varying doses (most recently 20mg qd), requires supplemental oxygen nocturnally (2L NC). She follows with Dr. Mosher (pulm) and all of her intubations have been at [facility] [place]. Patient has multiple flares per year where she becomes more hypoxic and hypophonic requiring increased steroid dosing and supplemental oxygen. She She has a history of persistent sinus tachycardia requiring r/o for PE in the past. She presents with 2 week history of significant LUE radiculopathy and numbness. She was scheduled to have spinal surgery at [place] but did not like her surgeon and now presents to [facility]. MRI showed disc herniation from C3-4 to C6-C7.      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 No nausea, vomiting, or hematemesis; No diarrhea or constipation. No melena or hematochezia.  GENITOURINARY: No dysuria, frequency, hematuria, or incontinence  NEUROLOGICAL: No headaches, memory loss, loss of strength, numbness, or tremors  SKIN: No itching, burning, rashes, or lesions   MUSCULOSKELETAL: No joint pain or swelling; No muscle, back, or extremity pain  PSYCHIATRIC: No depression, anxiety, mood swings, or difficulty sleeping      Medications:  MEDICATIONS  (STANDING):  ALBUTerol/ipratropium for Nebulization 3 milliLiter(s) Nebulizer every 6 hours  ALPRAZolam 0.25 milliGRAM(s) Oral two times a day  FLUoxetine 40 milliGRAM(s) Oral daily  influenza   Vaccine 0.5 milliLiter(s) IntraMuscular once  nortriptyline 50 milliGRAM(s) Oral daily  predniSONE   Tablet 10 milliGRAM(s) Oral two times a day  pregabalin 100 milliGRAM(s) Oral three times a day  propranolol LA 60 milliGRAM(s) Oral daily    MEDICATIONS  (PRN):  acetaminophen   Tablet. 650 milliGRAM(s) Oral every 6 hours PRN Mild Pain (1 - 3)  bisacodyl 5 milliGRAM(s) Oral daily PRN Constipation  cyclobenzaprine 10 milliGRAM(s) Oral three times a day PRN Muscle Spasm  HYDROmorphone   Tablet 2 milliGRAM(s) Oral every 4 hours PRN Severe Pain (7 - 10)  HYDROmorphone  Injectable 0.5 milliGRAM(s) IV Push every 4 hours PRN breakthrough  ondansetron   Disintegrating Tablet 4 milliGRAM(s) Oral every 6 hours PRN Nausea  oxyCODONE    IR 10 milliGRAM(s) Oral every 4 hours PRN Moderate Pain (4 - 6)  senna 2 Tablet(s) Oral at bedtime PRN Constipation            Vital Signs Last 24 Hrs  T(C): 37.2 (13 Feb 2018 11:12), Max: 37.2 (13 Feb 2018 11:12)  T(F): 99 (13 Feb 2018 11:12), Max: 99 (13 Feb 2018 11:12)  HR: 95 (13 Feb 2018 11:12) (85 - 98)  BP: 132/85 (13 Feb 2018 11:12) (130/94 - 159/92)  BP(mean): --  RR: 18 (13 Feb 2018 11:12) (16 - 18)  SpO2: 98% (13 Feb 2018 11:12) (95% - 98%) on RA                LABS:                        12.3   16.2  )-----------( 365      ( 12 Feb 2018 18:33 )             36.7     02-12    139  |  101  |  17  ----------------------------<  94  4.0   |  24  |  0.73    Ca    9.3      12 Feb 2018 18:33    TPro  6.8  /  Alb  3.8  /  TBili  0.1<L>  /  DBili  x   /  AST  12  /  ALT  10  /  AlkPhos  46  02-12          CAPILLARY BLOOD GLUCOSE        PT/INR - ( 12 Feb 2018 18:33 )   PT: 9.9 sec;   INR: 0.92 ratio         PTT - ( 12 Feb 2018 18:33 )  PTT:27.8 sec        Physical Examination:    General: No acute distress. Cushingoid face     HEENT: Pupils equal, reactive to light.  Symmetric.    PULM: Clear to auscultation bilaterally, no significant sputum production    CVS: S1, S2    ABD: Soft, nondistended, nontender, normoactive bowel sounds, no masses    EXT: No edema, nontender    SKIN: Warm and well perfused, no rashes noted.    NEURO: Alert, oriented, interactive, nonfocal    RADIOLOGY REVIEWED PULMONARY CONSULT    HPI: 49 y/o F with PMH of anxiety, s/p abdominoplasty, s/p cholecystectomy, former smoker (10 pack yrs), SBO (medically managed-2016), extensive pulmonary history starting with respiratory failure 2nd ARDS (8/2012) requiring tracheostomy with subsequent decannulation (10/2012), respiratory failure requiring intubation (12/2016) subsequently diagnosed with  (12/2016) via RUL VATS has been on chronic prednisone since that time at varying doses (most recently 20mg qd), requires supplemental oxygen nocturnally (2L NC). She follows with Dr. Mosher (pulm) and all of her intubations have been at [facility] [place]. Patient has multiple flares per year where she becomes more hypoxic and hypophonic requiring increased steroid dosing and supplemental oxygen. She She has a history of persistent sinus tachycardia requiring r/o for PE in the past. She presents with 2 week history of significant LUE radiculopathy and numbness. MRI showed disc herniation from C3-4 to C6-C7.      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 No nausea, vomiting, or hematemesis; No diarrhea or constipation. No melena or hematochezia.  GENITOURINARY: No dysuria, frequency, hematuria, or incontinence  NEUROLOGICAL: No headaches, memory loss, loss of strength, numbness, or tremors  SKIN: No itching, burning, rashes, or lesions   MUSCULOSKELETAL: No joint pain or swelling; No muscle, back, or extremity pain  PSYCHIATRIC: No depression, anxiety, mood swings, or difficulty sleeping      Medications:  MEDICATIONS  (STANDING):  ALBUTerol/ipratropium for Nebulization 3 milliLiter(s) Nebulizer every 6 hours  ALPRAZolam 0.25 milliGRAM(s) Oral two times a day  FLUoxetine 40 milliGRAM(s) Oral daily  influenza   Vaccine 0.5 milliLiter(s) IntraMuscular once  nortriptyline 50 milliGRAM(s) Oral daily  predniSONE   Tablet 10 milliGRAM(s) Oral two times a day  pregabalin 100 milliGRAM(s) Oral three times a day  propranolol LA 60 milliGRAM(s) Oral daily    MEDICATIONS  (PRN):  acetaminophen   Tablet. 650 milliGRAM(s) Oral every 6 hours PRN Mild Pain (1 - 3)  bisacodyl 5 milliGRAM(s) Oral daily PRN Constipation  cyclobenzaprine 10 milliGRAM(s) Oral three times a day PRN Muscle Spasm  HYDROmorphone   Tablet 2 milliGRAM(s) Oral every 4 hours PRN Severe Pain (7 - 10)  HYDROmorphone  Injectable 0.5 milliGRAM(s) IV Push every 4 hours PRN breakthrough  ondansetron   Disintegrating Tablet 4 milliGRAM(s) Oral every 6 hours PRN Nausea  oxyCODONE    IR 10 milliGRAM(s) Oral every 4 hours PRN Moderate Pain (4 - 6)  senna 2 Tablet(s) Oral at bedtime PRN Constipation            Vital Signs Last 24 Hrs  T(C): 37.2 (13 Feb 2018 11:12), Max: 37.2 (13 Feb 2018 11:12)  T(F): 99 (13 Feb 2018 11:12), Max: 99 (13 Feb 2018 11:12)  HR: 95 (13 Feb 2018 11:12) (85 - 98)  BP: 132/85 (13 Feb 2018 11:12) (130/94 - 159/92)  BP(mean): --  RR: 18 (13 Feb 2018 11:12) (16 - 18)  SpO2: 98% (13 Feb 2018 11:12) (95% - 98%) on RA                LABS:                        12.3   16.2  )-----------( 365      ( 12 Feb 2018 18:33 )             36.7     02-12    139  |  101  |  17  ----------------------------<  94  4.0   |  24  |  0.73    Ca    9.3      12 Feb 2018 18:33    TPro  6.8  /  Alb  3.8  /  TBili  0.1<L>  /  DBili  x   /  AST  12  /  ALT  10  /  AlkPhos  46  02-12          CAPILLARY BLOOD GLUCOSE        PT/INR - ( 12 Feb 2018 18:33 )   PT: 9.9 sec;   INR: 0.92 ratio         PTT - ( 12 Feb 2018 18:33 )  PTT:27.8 sec        Physical Examination:    General: No acute distress. Cushingoid face     HEENT: Pupils equal, reactive to light.  Symmetric.    PULM: Clear to auscultation bilaterally, no significant sputum production    CVS: S1, S2    ABD: Soft, nondistended, nontender, normoactive bowel sounds, no masses    EXT: No edema, nontender    SKIN: Warm and well perfused, no rashes noted.    NEURO: Alert, oriented, interactive, nonfocal    RADIOLOGY REVIEWED PULMONARY CONSULT    HPI: 49 y/o F with PMH of anxiety, s/p abdominoplasty, s/p cholecystectomy, former smoker (10 pack yrs), SBO (medically managed-2016), extensive pulmonary history starting with respiratory failure 2nd ARDS (8/2012) requiring tracheostomy with subsequent decannulation (10/2012), respiratory failure requiring intubation (12/2016) subsequently diagnosed with  (12/2016) via RUL VATS has been on chronic prednisone since that time at varying doses (most recently 20mg qd), requires supplemental oxygen nocturnally (2L NC). She follows with Dr. Mosher (pulm) and all of her intubations have been at [facility] [place]. Patient has multiple flares per year where she becomes more hypoxic and hypophonic requiring increased steroid dosing and supplemental oxygen. She She has a history of persistent sinus tachycardia requiring r/o for PE in the past. She presents with 2 week history of significant LUE radiculopathy and numbness. MRI showed disc herniation from C3-4 to C6-C7.      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 No nausea, vomiting, or hematemesis; No diarrhea or constipation. No melena or hematochezia.  GENITOURINARY: No dysuria, frequency, hematuria, or incontinence  NEUROLOGICAL: No headaches, memory loss, loss of strength, numbness, or tremors  SKIN: No itching, burning, rashes, or lesions   MUSCULOSKELETAL: No joint pain or swelling; No muscle, back, or extremity pain  PSYCHIATRIC: No depression, anxiety, mood swings, or difficulty sleeping      Medications:  MEDICATIONS  (STANDING):  ALBUTerol/ipratropium for Nebulization 3 milliLiter(s) Nebulizer every 6 hours  ALPRAZolam 0.25 milliGRAM(s) Oral two times a day  FLUoxetine 40 milliGRAM(s) Oral daily  influenza   Vaccine 0.5 milliLiter(s) IntraMuscular once  nortriptyline 50 milliGRAM(s) Oral daily  predniSONE   Tablet 10 milliGRAM(s) Oral two times a day  pregabalin 100 milliGRAM(s) Oral three times a day  propranolol LA 60 milliGRAM(s) Oral daily    MEDICATIONS  (PRN):  acetaminophen   Tablet. 650 milliGRAM(s) Oral every 6 hours PRN Mild Pain (1 - 3)  bisacodyl 5 milliGRAM(s) Oral daily PRN Constipation  cyclobenzaprine 10 milliGRAM(s) Oral three times a day PRN Muscle Spasm  HYDROmorphone   Tablet 2 milliGRAM(s) Oral every 4 hours PRN Severe Pain (7 - 10)  HYDROmorphone  Injectable 0.5 milliGRAM(s) IV Push every 4 hours PRN breakthrough  ondansetron   Disintegrating Tablet 4 milliGRAM(s) Oral every 6 hours PRN Nausea  oxyCODONE    IR 10 milliGRAM(s) Oral every 4 hours PRN Moderate Pain (4 - 6)  senna 2 Tablet(s) Oral at bedtime PRN Constipation            Vital Signs Last 24 Hrs  T(C): 37.2 (13 Feb 2018 11:12), Max: 37.2 (13 Feb 2018 11:12)  T(F): 99 (13 Feb 2018 11:12), Max: 99 (13 Feb 2018 11:12)  HR: 95 (13 Feb 2018 11:12) (85 - 98)  BP: 132/85 (13 Feb 2018 11:12) (130/94 - 159/92)  BP(mean): --  RR: 18 (13 Feb 2018 11:12) (16 - 18)  SpO2: 98% (13 Feb 2018 11:12) (95% - 98%) on RA                LABS:                        12.3   16.2  )-----------( 365      ( 12 Feb 2018 18:33 )             36.7     02-12    139  |  101  |  17  ----------------------------<  94  4.0   |  24  |  0.73    Ca    9.3      12 Feb 2018 18:33    TPro  6.8  /  Alb  3.8  /  TBili  0.1<L>  /  DBili  x   /  AST  12  /  ALT  10  /  AlkPhos  46  02-12          CAPILLARY BLOOD GLUCOSE        PT/INR - ( 12 Feb 2018 18:33 )   PT: 9.9 sec;   INR: 0.92 ratio         PTT - ( 12 Feb 2018 18:33 )  PTT:27.8 sec        Physical Examination:    General: No acute distress. Cushingoid face     HEENT: Pupils equal, reactive to light.  Symmetric.    PULM: occasional ronchi anteriorly,    CVS: S1, S2    ABD: Soft, nondistended, nontender, normoactive bowel sounds, no masses    EXT: No edema, nontender    SKIN: Warm and well perfused, no rashes noted.    NEURO: Alert, oriented, interactive, nonfocal    RADIOLOGY REVIEWED

## 2018-02-13 NOTE — CONSULT NOTE ADULT - ATTENDING COMMENTS
Check ABG, if wnl would proceed with planned surgery.  Due to her chronic lung disease, the patient remains a high risk for post op respiratory complications, including respiratory failure, but there are no absolute pulmonary complications to planned procedure.  Suggest fast-track extubation as able. Discussed with patient,  and surgeon. CT Chest reviewed with chest radiologist demonstrated B/l anterior hard changes with early fibrosis. Family to bring old studies for comparison.  Check ABG, if wnl would proceed with planned surgery.  Due to her chronic lung disease, the patient remains a high risk for post op respiratory complications, including respiratory failure, but there are no absolute pulmonary complications to planned procedure.  Suggest fast-track extubation as able. Discussed with patient,  and surgeon.

## 2018-02-13 NOTE — CONSULT NOTE ADULT - PROBLEM SELECTOR RECOMMENDATION 9
Pt w/significant pulmonary comorbidity. Needs pulmonary consult. Pt w/frequent dyspnea on exertion, multiple critical illnesses including approx within 1 yr ago. Will need to obtain information from outpatient pulmonlogist Dr. Mosher. Additionally due to frequent LOMELI needs cards clearance as well.   Pain control per NSGY - Soma can be replaced by Flexeril if needed, pt has only been on Soma for 1 wk and is unlikely in danger of withdrawal.

## 2018-02-13 NOTE — PROGRESS NOTE ADULT - SUBJECTIVE AND OBJECTIVE BOX
subjective: C/o increased pain, numbness and weakness Lt. arm and hand    PHYSICAL EXAM:    Constitutional:    Neurological:     Motor exam:          Upper extremity                         Delt     Bicep     Tricep    HG                                                 R         5/5 5/5 5/5       5/5                                               L          5/5 5/5 4/5 4/5          Lower extremity                        HF         KF        KE       DF         PF                                                  R        5/5 5/5 5/5 5/5 5/5                                               L         5/5 5/5 5/5 5/5 5/5                                                        [x] warm well perfused; capillary refill <3 seconds     Sensation: x intact to light touch  [] decrease      Incision:     Misc: CT showing minimal osteophyte formation C3-C7 without OPLL.

## 2018-02-13 NOTE — CONSULT NOTE ADULT - SUBJECTIVE AND OBJECTIVE BOX
CHIEF COMPLAINT:Patient is a 48y old  Female who presents with a chief complaint of LUE radiculopathy (12 Feb 2018 18:11)      HISTORY OF PRESENT ILLNESS:  47 y/o F with PMH of anxiety, s/p abdominoplasty, s/p cholecystectomy, former smoker (10 pack yrs), SBO (medically managed-2016), extensive pulmonary history starting with respiratory failure 2nd ARDS (8/2012) requiring tracheostomy with subsequent decannulation (10/2012), respiratory failure requiring intubation (2015 and 2016) subsequently diagnosed with  (12/2016) via RUL VATS has been on chronic prednisone since that time at varying doses (most recently 20mg qd), requires supplemental oxygen nocturnally (2L NC  She presents with 2 week history of significant LUE radiculopathy and numbness. She was scheduled to have spinal surgery at [place] but did not like her surgeon and now presents to [facility]. MRI showed disc herniation from C3-4 to C6-C7.  she recommend sob or cp now  PAST MEDICAL & SURGICAL HISTORY:  Cryptogenic organizing pneumonia: chest tube/trach  Herniated disc, cervical          MEDICATIONS:  propranolol LA 60 milliGRAM(s) Oral daily      ALBUTerol/ipratropium for Nebulization 3 milliLiter(s) Nebulizer every 6 hours    acetaminophen   Tablet. 650 milliGRAM(s) Oral every 6 hours PRN  ALPRAZolam 0.25 milliGRAM(s) Oral two times a day  cyclobenzaprine 10 milliGRAM(s) Oral three times a day PRN  FLUoxetine 40 milliGRAM(s) Oral daily  HYDROmorphone   Tablet 2 milliGRAM(s) Oral every 4 hours PRN  HYDROmorphone  Injectable 0.5 milliGRAM(s) IV Push every 4 hours PRN  nortriptyline 50 milliGRAM(s) Oral daily  ondansetron   Disintegrating Tablet 4 milliGRAM(s) Oral every 6 hours PRN  oxyCODONE    IR 10 milliGRAM(s) Oral every 4 hours PRN  pregabalin 100 milliGRAM(s) Oral three times a day    bisacodyl 5 milliGRAM(s) Oral daily PRN  senna 2 Tablet(s) Oral at bedtime PRN    predniSONE   Tablet 10 milliGRAM(s) Oral two times a day    influenza   Vaccine 0.5 milliLiter(s) IntraMuscular once      FAMILY HISTORY:      Non-contributory    SOCIAL HISTORY:    No tobacco, drugs or etoh    Allergies    No Known Allergies    Intolerances    	    REVIEW OF SYSTEMS:  as above  The rest of the 14 points ROS reviewed and except above they are unremarkable.        PHYSICAL EXAM:  T(C): 37.2 (02-13-18 @ 11:12), Max: 37.2 (02-13-18 @ 11:12)  HR: 95 (02-13-18 @ 11:12) (85 - 98)  BP: 132/85 (02-13-18 @ 11:12) (132/85 - 159/92)  RR: 18 (02-13-18 @ 11:12) (16 - 18)  SpO2: 98% (02-13-18 @ 11:12) (95% - 98%)  Wt(kg): --  I&O's Summary      JVP: Normal  Neck: supple  Lung: clear   CV: S1 S2 , Murmur:  Abd: soft  Ext: No edema  neuro: Awake / alert  Psych: flat affect  Skin: normal     LABS/DATA:    TELEMETRY: 	    ECG:  	sinus, evidence of right heart strain    	  CARDIAC MARKERS:                                  12.3   16.2  )-----------( 365      ( 12 Feb 2018 18:33 )             36.7     02-12    139  |  101  |  17  ----------------------------<  94  4.0   |  24  |  0.73    Ca    9.3      12 Feb 2018 18:33    TPro  6.8  /  Alb  3.8  /  TBili  0.1<L>  /  DBili  x   /  AST  12  /  ALT  10  /  AlkPhos  46  02-12    proBNP:   Lipid Profile:   HgA1c:   TSH:

## 2018-02-13 NOTE — CONSULT NOTE ADULT - PROBLEM SELECTOR RECOMMENDATION 9
Outpt PFT's obtained from 1/2018, placed in chart  -normal FEV1/FVC ratio (89%)  -Decreased total lung capacity (59% predicted, 2.59L)  -Decreased DLCO (46%)  -Findings c/w restrictive lung disease -?post  or post ARDS residual anteriorly  -Patient instructed to obtain old discs from prior CT  -Will need stress dose hydrocortisone intra-op  -Would consider PCP prophylaxis w bactrim DS 1tab TIW given chronic steroids -?post  or post ARDS residual disease anteriorly  -Patient instructed to obtain old discs from prior CT  -Will need stress dose hydrocortisone intra-op  -Would consider PCP prophylaxis w bactrim DS 1tab TIW given chronic steroids

## 2018-02-13 NOTE — CONSULT NOTE ADULT - ASSESSMENT
49 y/o F with PMH of anxiety, s/p abdominoplasty, s/p cholecystectomy, former smoker (10 pack yrs), SBO (medically managed-2016), extensive pulmonary history starting with respiratory failure 2nd ARDS (8/2012) requiring tracheostomy with subsequent decannulation (10/2012), respiratory failure requiring intubation (2015 and 2016) subsequently diagnosed with  (12/2016) via RUL VATS wedge biopsy has been on chronic prednisone since that time at varying doses (most recently 20mg qd), requires supplemental oxygen nocturnally (2L NC). She follows with Dr. Mosher (pul) and [place] [facility] [place] has multiple flares per year where she becomes more hypoxic and hypophonic requiring increased steroid dosing and supplemental oxygen. She She has a history of persistent sinus tachycardia requiring r/o for PE in the past. She presents with 2 week history of significant LUE radiculopathy and numbness. She was scheduled to have spinal surgery at [place] but did not like her surgeon and now presents to [facility]. MRI showed disc herniation from C3-4 to C6-C7 being recommended for ACDF 49 y/o F with PMH of anxiety, s/p abdominoplasty, s/p cholecystectomy, former smoker (10 pack yrs), SBO (medically managed-2016), extensive pulmonary history starting with respiratory failure 2nd ARDS (8/2012) requiring tracheostomy with subsequent decannulation (10/2012), respiratory failure requiring intubation (2015 and 2016) subsequently diagnosed with  (12/2016) via RUL VATS wedge biopsy has been on chronic prednisone since that time at varying doses (most recently 20mg qd), requires supplemental oxygen nocturnally (2L NC). She follows with Dr. Mosher (pulm) and [place] of [place] [facility] [place] Patient has multiple flares per year where she becomes more hypoxic and hypophonic requiring increased steroid dosing and supplemental oxygen. She She has a history of persistent sinus tachycardia requiring r/o for PE in the past. She presents with 2 week history of significant LUE radiculopathy and numbness. MRI showed disc herniation from C3-4 to C6-C7 being recommended for ACDF

## 2018-02-14 ENCOUNTER — TRANSCRIPTION ENCOUNTER (OUTPATIENT)
Age: 48
End: 2018-02-14

## 2018-02-14 ENCOUNTER — APPOINTMENT (OUTPATIENT)
Dept: SPINE | Facility: HOSPITAL | Age: 48
End: 2018-02-14

## 2018-02-14 LAB
ALBUMIN SERPL ELPH-MCNC: 3.8 G/DL — SIGNIFICANT CHANGE UP (ref 3.3–5)
ALP SERPL-CCNC: 48 U/L — SIGNIFICANT CHANGE UP (ref 40–120)
ALT FLD-CCNC: 12 U/L — SIGNIFICANT CHANGE UP (ref 10–45)
ANION GAP SERPL CALC-SCNC: 13 MMOL/L — SIGNIFICANT CHANGE UP (ref 5–17)
ANION GAP SERPL CALC-SCNC: 14 MMOL/L — SIGNIFICANT CHANGE UP (ref 5–17)
AST SERPL-CCNC: 12 U/L — SIGNIFICANT CHANGE UP (ref 10–40)
BASOPHILS # BLD AUTO: 0 K/UL — SIGNIFICANT CHANGE UP (ref 0–0.2)
BASOPHILS NFR BLD AUTO: 0 % — SIGNIFICANT CHANGE UP (ref 0–2)
BILIRUB SERPL-MCNC: <0.2 MG/DL — SIGNIFICANT CHANGE UP (ref 0.2–1.2)
BUN SERPL-MCNC: 14 MG/DL — SIGNIFICANT CHANGE UP (ref 7–23)
BUN SERPL-MCNC: 18 MG/DL — SIGNIFICANT CHANGE UP (ref 7–23)
CALCIUM SERPL-MCNC: 10 MG/DL — SIGNIFICANT CHANGE UP (ref 8.4–10.5)
CALCIUM SERPL-MCNC: 9.5 MG/DL — SIGNIFICANT CHANGE UP (ref 8.4–10.5)
CHLORIDE SERPL-SCNC: 102 MMOL/L — SIGNIFICANT CHANGE UP (ref 96–108)
CHLORIDE SERPL-SCNC: 103 MMOL/L — SIGNIFICANT CHANGE UP (ref 96–108)
CO2 SERPL-SCNC: 22 MMOL/L — SIGNIFICANT CHANGE UP (ref 22–31)
CO2 SERPL-SCNC: 23 MMOL/L — SIGNIFICANT CHANGE UP (ref 22–31)
CREAT SERPL-MCNC: 0.71 MG/DL — SIGNIFICANT CHANGE UP (ref 0.5–1.3)
CREAT SERPL-MCNC: 0.71 MG/DL — SIGNIFICANT CHANGE UP (ref 0.5–1.3)
EOSINOPHIL # BLD AUTO: 0 K/UL — SIGNIFICANT CHANGE UP (ref 0–0.5)
EOSINOPHIL NFR BLD AUTO: 0 — SIGNIFICANT CHANGE UP
GLUCOSE SERPL-MCNC: 118 MG/DL — HIGH (ref 70–99)
GLUCOSE SERPL-MCNC: 157 MG/DL — HIGH (ref 70–99)
HCG SERPL QL: NEGATIVE — SIGNIFICANT CHANGE UP
HCT VFR BLD CALC: 37 % — SIGNIFICANT CHANGE UP (ref 34.5–45)
HGB BLD-MCNC: 12.5 G/DL — SIGNIFICANT CHANGE UP (ref 11.5–15.5)
LYMPHOCYTES # BLD AUTO: 0.97 K/UL — LOW (ref 1–3.3)
LYMPHOCYTES # BLD AUTO: 7.2 % — LOW (ref 13–44)
MAGNESIUM SERPL-MCNC: 2 MG/DL — SIGNIFICANT CHANGE UP (ref 1.6–2.6)
MCHC RBC-ENTMCNC: 33.7 GM/DL — SIGNIFICANT CHANGE UP (ref 32–36)
MCHC RBC-ENTMCNC: 38.7 PG — HIGH (ref 27–34)
MCV RBC AUTO: 115 FL — HIGH (ref 80–100)
MONOCYTES # BLD AUTO: 0.36 K/UL — SIGNIFICANT CHANGE UP (ref 0–0.9)
MONOCYTES NFR BLD AUTO: 2.7 % — SIGNIFICANT CHANGE UP (ref 2–14)
NEUTROPHILS # BLD AUTO: 12.02 K/UL — HIGH (ref 1.8–7.4)
NEUTROPHILS NFR BLD AUTO: 89.2 % — HIGH (ref 43–77)
PHOSPHATE SERPL-MCNC: 3 MG/DL — SIGNIFICANT CHANGE UP (ref 2.5–4.5)
PLATELET # BLD AUTO: 379 K/UL — SIGNIFICANT CHANGE UP (ref 150–400)
POTASSIUM SERPL-MCNC: 4.5 MMOL/L — SIGNIFICANT CHANGE UP (ref 3.5–5.3)
POTASSIUM SERPL-MCNC: 4.5 MMOL/L — SIGNIFICANT CHANGE UP (ref 3.5–5.3)
POTASSIUM SERPL-SCNC: 4.5 MMOL/L — SIGNIFICANT CHANGE UP (ref 3.5–5.3)
POTASSIUM SERPL-SCNC: 4.5 MMOL/L — SIGNIFICANT CHANGE UP (ref 3.5–5.3)
PROT SERPL-MCNC: 6.5 G/DL — SIGNIFICANT CHANGE UP (ref 6–8.3)
RBC # BLD: 3.22 M/UL — LOW (ref 3.8–5.2)
RBC # FLD: 13.5 % — SIGNIFICANT CHANGE UP (ref 10.3–14.5)
SODIUM SERPL-SCNC: 138 MMOL/L — SIGNIFICANT CHANGE UP (ref 135–145)
SODIUM SERPL-SCNC: 139 MMOL/L — SIGNIFICANT CHANGE UP (ref 135–145)
WBC # BLD: 19.3 K/UL — HIGH (ref 3.8–10.5)
WBC # FLD AUTO: 19.3 K/UL — HIGH (ref 3.8–10.5)

## 2018-02-14 PROCEDURE — 22853 INSJ BIOMECHANICAL DEVICE: CPT | Mod: 59,GC

## 2018-02-14 PROCEDURE — 22551 ARTHRD ANT NTRBDY CERVICAL: CPT | Mod: 62,GC

## 2018-02-14 PROCEDURE — 22552 ARTHRD ANT NTRBD CERVICAL EA: CPT | Mod: 62,GC

## 2018-02-14 PROCEDURE — 22552 ARTHRD ANT NTRBD CERVICAL EA: CPT | Mod: 62

## 2018-02-14 PROCEDURE — 22551 ARTHRD ANT NTRBDY CERVICAL: CPT | Mod: 62

## 2018-02-14 RX ORDER — ACETAMINOPHEN 500 MG
1000 TABLET ORAL ONCE
Qty: 0 | Refills: 0 | Status: COMPLETED | OUTPATIENT
Start: 2018-02-14 | End: 2018-02-14

## 2018-02-14 RX ORDER — NALOXONE HYDROCHLORIDE 4 MG/.1ML
0.1 SPRAY NASAL
Qty: 0 | Refills: 0 | Status: DISCONTINUED | OUTPATIENT
Start: 2018-02-14 | End: 2018-02-15

## 2018-02-14 RX ORDER — ACETAMINOPHEN 500 MG
650 TABLET ORAL EVERY 6 HOURS
Qty: 0 | Refills: 0 | Status: DISCONTINUED | OUTPATIENT
Start: 2018-02-14 | End: 2018-02-16

## 2018-02-14 RX ORDER — CEFAZOLIN SODIUM 1 G
2000 VIAL (EA) INJECTION EVERY 8 HOURS
Qty: 0 | Refills: 0 | Status: COMPLETED | OUTPATIENT
Start: 2018-02-14 | End: 2018-02-14

## 2018-02-14 RX ORDER — HYDROMORPHONE HYDROCHLORIDE 2 MG/ML
0.5 INJECTION INTRAMUSCULAR; INTRAVENOUS; SUBCUTANEOUS
Qty: 0 | Refills: 0 | Status: DISCONTINUED | OUTPATIENT
Start: 2018-02-14 | End: 2018-02-15

## 2018-02-14 RX ORDER — NORTRIPTYLINE HYDROCHLORIDE 10 MG/1
50 CAPSULE ORAL DAILY
Qty: 0 | Refills: 0 | Status: DISCONTINUED | OUTPATIENT
Start: 2018-02-14 | End: 2018-02-16

## 2018-02-14 RX ORDER — SENNA PLUS 8.6 MG/1
2 TABLET ORAL AT BEDTIME
Qty: 0 | Refills: 0 | Status: DISCONTINUED | OUTPATIENT
Start: 2018-02-14 | End: 2018-02-16

## 2018-02-14 RX ORDER — ALPRAZOLAM 0.25 MG
0.25 TABLET ORAL
Qty: 0 | Refills: 0 | Status: DISCONTINUED | OUTPATIENT
Start: 2018-02-14 | End: 2018-02-16

## 2018-02-14 RX ORDER — CYCLOBENZAPRINE HYDROCHLORIDE 10 MG/1
5 TABLET, FILM COATED ORAL ONCE
Qty: 0 | Refills: 0 | Status: COMPLETED | OUTPATIENT
Start: 2018-02-14 | End: 2018-02-14

## 2018-02-14 RX ORDER — HYDROMORPHONE HYDROCHLORIDE 2 MG/ML
2 INJECTION INTRAMUSCULAR; INTRAVENOUS; SUBCUTANEOUS EVERY 4 HOURS
Qty: 0 | Refills: 0 | Status: DISCONTINUED | OUTPATIENT
Start: 2018-02-14 | End: 2018-02-14

## 2018-02-14 RX ORDER — DEXAMETHASONE 0.5 MG/5ML
4 ELIXIR ORAL EVERY 6 HOURS
Qty: 0 | Refills: 0 | Status: DISCONTINUED | OUTPATIENT
Start: 2018-02-14 | End: 2018-02-15

## 2018-02-14 RX ORDER — HYDROMORPHONE HYDROCHLORIDE 2 MG/ML
0.5 INJECTION INTRAMUSCULAR; INTRAVENOUS; SUBCUTANEOUS EVERY 4 HOURS
Qty: 0 | Refills: 0 | Status: DISCONTINUED | OUTPATIENT
Start: 2018-02-14 | End: 2018-02-14

## 2018-02-14 RX ORDER — FLUOXETINE HCL 10 MG
40 CAPSULE ORAL DAILY
Qty: 0 | Refills: 0 | Status: DISCONTINUED | OUTPATIENT
Start: 2018-02-14 | End: 2018-02-16

## 2018-02-14 RX ORDER — OXYCODONE HYDROCHLORIDE 5 MG/1
10 TABLET ORAL EVERY 4 HOURS
Qty: 0 | Refills: 0 | Status: DISCONTINUED | OUTPATIENT
Start: 2018-02-14 | End: 2018-02-14

## 2018-02-14 RX ORDER — ONDANSETRON 8 MG/1
4 TABLET, FILM COATED ORAL ONCE
Qty: 0 | Refills: 0 | Status: DISCONTINUED | OUTPATIENT
Start: 2018-02-14 | End: 2018-02-15

## 2018-02-14 RX ORDER — HYDROMORPHONE HYDROCHLORIDE 2 MG/ML
0.5 INJECTION INTRAMUSCULAR; INTRAVENOUS; SUBCUTANEOUS ONCE
Qty: 0 | Refills: 0 | Status: DISCONTINUED | OUTPATIENT
Start: 2018-02-14 | End: 2018-02-14

## 2018-02-14 RX ORDER — IPRATROPIUM/ALBUTEROL SULFATE 18-103MCG
3 AEROSOL WITH ADAPTER (GRAM) INHALATION EVERY 6 HOURS
Qty: 0 | Refills: 0 | Status: DISCONTINUED | OUTPATIENT
Start: 2018-02-14 | End: 2018-02-15

## 2018-02-14 RX ORDER — HYDROMORPHONE HYDROCHLORIDE 2 MG/ML
0.25 INJECTION INTRAMUSCULAR; INTRAVENOUS; SUBCUTANEOUS
Qty: 0 | Refills: 0 | Status: DISCONTINUED | OUTPATIENT
Start: 2018-02-14 | End: 2018-02-14

## 2018-02-14 RX ORDER — ENOXAPARIN SODIUM 100 MG/ML
40 INJECTION SUBCUTANEOUS AT BEDTIME
Qty: 0 | Refills: 0 | Status: DISCONTINUED | OUTPATIENT
Start: 2018-02-15 | End: 2018-02-16

## 2018-02-14 RX ORDER — PROPRANOLOL HCL 160 MG
60 CAPSULE, EXTENDED RELEASE 24HR ORAL DAILY
Qty: 0 | Refills: 0 | Status: DISCONTINUED | OUTPATIENT
Start: 2018-02-14 | End: 2018-02-16

## 2018-02-14 RX ORDER — ONDANSETRON 8 MG/1
4 TABLET, FILM COATED ORAL EVERY 6 HOURS
Qty: 0 | Refills: 0 | Status: DISCONTINUED | OUTPATIENT
Start: 2018-02-14 | End: 2018-02-16

## 2018-02-14 RX ORDER — HYDROMORPHONE HYDROCHLORIDE 2 MG/ML
0.25 INJECTION INTRAMUSCULAR; INTRAVENOUS; SUBCUTANEOUS ONCE
Qty: 0 | Refills: 0 | Status: DISCONTINUED | OUTPATIENT
Start: 2018-02-14 | End: 2018-02-14

## 2018-02-14 RX ORDER — HYDROMORPHONE HYDROCHLORIDE 2 MG/ML
30 INJECTION INTRAMUSCULAR; INTRAVENOUS; SUBCUTANEOUS
Qty: 0 | Refills: 0 | Status: DISCONTINUED | OUTPATIENT
Start: 2018-02-14 | End: 2018-02-15

## 2018-02-14 RX ORDER — DIAZEPAM 5 MG
5 TABLET ORAL EVERY 8 HOURS
Qty: 0 | Refills: 0 | Status: DISCONTINUED | OUTPATIENT
Start: 2018-02-14 | End: 2018-02-16

## 2018-02-14 RX ORDER — DEXTROSE MONOHYDRATE, SODIUM CHLORIDE, AND POTASSIUM CHLORIDE 50; .745; 4.5 G/1000ML; G/1000ML; G/1000ML
1000 INJECTION, SOLUTION INTRAVENOUS
Qty: 0 | Refills: 0 | Status: DISCONTINUED | OUTPATIENT
Start: 2018-02-14 | End: 2018-02-15

## 2018-02-14 RX ORDER — HYDROCORTISONE 20 MG
100 TABLET ORAL ONCE
Qty: 0 | Refills: 0 | Status: COMPLETED | OUTPATIENT
Start: 2018-02-14 | End: 2018-02-14

## 2018-02-14 RX ORDER — CYCLOBENZAPRINE HYDROCHLORIDE 10 MG/1
10 TABLET, FILM COATED ORAL THREE TIMES A DAY
Qty: 0 | Refills: 0 | Status: DISCONTINUED | OUTPATIENT
Start: 2018-02-14 | End: 2018-02-14

## 2018-02-14 RX ADMIN — Medication 4 MILLIGRAM(S): at 18:12

## 2018-02-14 RX ADMIN — HYDROMORPHONE HYDROCHLORIDE 30 MILLILITER(S): 2 INJECTION INTRAMUSCULAR; INTRAVENOUS; SUBCUTANEOUS at 21:58

## 2018-02-14 RX ADMIN — Medication 5 MILLIGRAM(S): at 13:19

## 2018-02-14 RX ADMIN — OXYCODONE HYDROCHLORIDE 10 MILLIGRAM(S): 5 TABLET ORAL at 02:35

## 2018-02-14 RX ADMIN — DEXTROSE MONOHYDRATE, SODIUM CHLORIDE, AND POTASSIUM CHLORIDE 75 MILLILITER(S): 50; .745; 4.5 INJECTION, SOLUTION INTRAVENOUS at 16:35

## 2018-02-14 RX ADMIN — Medication 100 MILLIGRAM(S): at 17:28

## 2018-02-14 RX ADMIN — OXYCODONE HYDROCHLORIDE 10 MILLIGRAM(S): 5 TABLET ORAL at 03:30

## 2018-02-14 RX ADMIN — CYCLOBENZAPRINE HYDROCHLORIDE 5 MILLIGRAM(S): 10 TABLET, FILM COATED ORAL at 17:58

## 2018-02-14 RX ADMIN — Medication 60 MILLIGRAM(S): at 13:20

## 2018-02-14 RX ADMIN — Medication 40 MILLIGRAM(S): at 14:52

## 2018-02-14 RX ADMIN — HYDROMORPHONE HYDROCHLORIDE 30 MILLILITER(S): 2 INJECTION INTRAMUSCULAR; INTRAVENOUS; SUBCUTANEOUS at 17:59

## 2018-02-14 RX ADMIN — Medication 1000 MILLIGRAM(S): at 17:45

## 2018-02-14 RX ADMIN — Medication 3 MILLILITER(S): at 23:57

## 2018-02-14 RX ADMIN — Medication 0.25 MILLIGRAM(S): at 23:57

## 2018-02-14 RX ADMIN — Medication 100 MILLIGRAM(S): at 21:46

## 2018-02-14 RX ADMIN — Medication 100 MILLIGRAM(S): at 14:23

## 2018-02-14 RX ADMIN — HYDROMORPHONE HYDROCHLORIDE 0.25 MILLIGRAM(S): 2 INJECTION INTRAMUSCULAR; INTRAVENOUS; SUBCUTANEOUS at 13:45

## 2018-02-14 RX ADMIN — NORTRIPTYLINE HYDROCHLORIDE 50 MILLIGRAM(S): 10 CAPSULE ORAL at 21:47

## 2018-02-14 RX ADMIN — Medication 100 MILLIGRAM(S): at 18:12

## 2018-02-14 RX ADMIN — Medication 100 MILLIGRAM(S): at 00:27

## 2018-02-14 RX ADMIN — Medication 3 MILLILITER(S): at 00:27

## 2018-02-14 RX ADMIN — Medication 4 MILLIGRAM(S): at 23:57

## 2018-02-14 RX ADMIN — Medication 3 MILLILITER(S): at 18:00

## 2018-02-14 RX ADMIN — HYDROMORPHONE HYDROCHLORIDE 30 MILLILITER(S): 2 INJECTION INTRAMUSCULAR; INTRAVENOUS; SUBCUTANEOUS at 14:13

## 2018-02-14 RX ADMIN — Medication 0.25 MILLIGRAM(S): at 13:19

## 2018-02-14 RX ADMIN — HYDROMORPHONE HYDROCHLORIDE 0.25 MILLIGRAM(S): 2 INJECTION INTRAMUSCULAR; INTRAVENOUS; SUBCUTANEOUS at 14:00

## 2018-02-14 RX ADMIN — HYDROMORPHONE HYDROCHLORIDE 0.5 MILLIGRAM(S): 2 INJECTION INTRAMUSCULAR; INTRAVENOUS; SUBCUTANEOUS at 12:32

## 2018-02-14 RX ADMIN — Medication 400 MILLIGRAM(S): at 17:28

## 2018-02-14 RX ADMIN — HYDROMORPHONE HYDROCHLORIDE 2 MILLIGRAM(S): 2 INJECTION INTRAMUSCULAR; INTRAVENOUS; SUBCUTANEOUS at 06:13

## 2018-02-14 NOTE — PHYSICAL THERAPY INITIAL EVALUATION ADULT - PRECAUTIONS/LIMITATIONS, REHAB EVAL
spinal precautions/recently 12/2016, h/o trach s/p decannulation, on chronic steroids on home oxygen QHS. MRI showed disc herniation from C3-4 to C6-C7. Now s/p C3-C7 ACDF on 2/14./oxygen therapy device and L/min

## 2018-02-14 NOTE — PHYSICAL THERAPY INITIAL EVALUATION ADULT - ACTIVE RANGE OF MOTION EXAMINATION, REHAB EVAL
no Active ROM deficits were identified/Right UE Active ROM was WFL (within functional limits)/Left UE Active ROM was WFL (within functional limits)

## 2018-02-14 NOTE — PHYSICAL THERAPY INITIAL EVALUATION ADULT - ADDITIONAL COMMENTS
Pt lives with her spouse in a PH, no steps to enter. 1 flight of stairs to negotiate. Pt states she has been staying on main floor and will be staying on main floor after D/C. Pt was independent with ambulation without an AD and was independent with all ADL's.

## 2018-02-14 NOTE — PHYSICAL THERAPY INITIAL EVALUATION ADULT - PERTINENT HX OF CURRENT PROBLEM, REHAB EVAL
Pt is a 47 yo female with 2 week history of significant LUE radiculopathy and numbness.  Denies any recent trauma, weakness. PMH positive for cryptogenic organizing PNA. x 5 yrs w/multiple hospitalizations for pneumonia requiring weeks-long intubations most

## 2018-02-14 NOTE — PROGRESS NOTE ADULT - PROBLEM SELECTOR PLAN 1
Bilateral GGO with mild peripheral reticulation, upper lobe predominant likely represent early fibrosis as sequelae of  or ARDS   -Will compare to outpt films   -s/p 100mg Hydrocortisone in OR today, dose 100mg x1 this afternoon as stress dose   -Restart Prednisone 20mg qd tomorrow  -Patient extubated in OR, seen in PACU awake and alert on 4L NC  -Consider starting PCP ppx with Bactrim DS 1tab TIW

## 2018-02-14 NOTE — CONSULT NOTE ADULT - SUBJECTIVE AND OBJECTIVE BOX
MH of anxiety, s/p abdominoplasty, s/p cholecystectomy, former smoker (10 pack yrs), SBO (medically managed-2016), extensive pulmonary history starting with respiratory failure 2nd ARDS (8/2012) requiring tracheostomy with subsequent decannulation (10/2012), respiratory failure requiring intubation (2015 and 2016) subsequently diagnosed with  (12/2016) via RUL VATS s/p prorogued tracheostomy subsequently decannulated and been on chronic prednisone since that time at varying doses (most recently 20mg qd), requires supplemental oxygen nocturnally (2L NC  She presents with 2 week history of significant LUE radiculopathy and numbness. MRI showed disc herniation from C3-4 to C6-C7. She had discussed with Dr. Lowe option of cervical discectomy and they elected to proceed. ENt was called for evaluation and possible assistance on the approach.  Pt states does have some difficulty swallowing since trach, needs to cut up food into small pieces but otherwise tolerates fine. also has noticed a differences in her voice since the multiple intubations and trach.     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 No nausea, vomiting, or hematemesis; No diarrhea or constipation. No melena or hematochezia.  GENITOURINARY: No dysuria, frequency, hematuria, or incontinence  NEUROLOGICAL: No headaches, memory loss, loss of strength, numbness, or tremors  SKIN: No itching, burning, rashes, or lesions   MUSCULOSKELETAL: No joint pain or swelling; No muscle, back, or extremity pain  PSYCHIATRIC: No depression, anxiety, mood swings, or difficulty sleeping      Medications:  MEDICATIONS  (STANDING):  ALBUTerol/ipratropium for Nebulization 3 milliLiter(s) Nebulizer every 6 hours  ALPRAZolam 0.25 milliGRAM(s) Oral two times a day  FLUoxetine 40 milliGRAM(s) Oral daily  influenza   Vaccine 0.5 milliLiter(s) IntraMuscular once  nortriptyline 50 milliGRAM(s) Oral daily  predniSONE   Tablet 10 milliGRAM(s) Oral two times a day  pregabalin 100 milliGRAM(s) Oral three times a day  propranolol LA 60 milliGRAM(s) Oral daily    MEDICATIONS  (PRN):  acetaminophen   Tablet. 650 milliGRAM(s) Oral every 6 hours PRN Mild Pain (1 - 3)  bisacodyl 5 milliGRAM(s) Oral daily PRN Constipation  cyclobenzaprine 10 milliGRAM(s) Oral three times a day PRN Muscle Spasm  HYDROmorphone   Tablet 2 milliGRAM(s) Oral every 4 hours PRN Severe Pain (7 - 10)  HYDROmorphone  Injectable 0.5 milliGRAM(s) IV Push every 4 hours PRN breakthrough  ondansetron   Disintegrating Tablet 4 milliGRAM(s) Oral every 6 hours PRN Nausea  oxyCODONE    IR 10 milliGRAM(s) Oral every 4 hours PRN Moderate Pain (4 - 6)  senna 2 Tablet(s) Oral at bedtime PRN Constipation                    Vital Signs Last 24 Hrs  T(C): 36.7 (14 Feb 2018 06:53), Max: 37.2 (13 Feb 2018 11:12)  T(F): 98.1 (14 Feb 2018 06:53), Max: 99 (13 Feb 2018 11:12)  HR: 77 (14 Feb 2018 06:53) (76 - 98)  BP: 141/95 (14 Feb 2018 06:53) (116/78 - 141/95)  BP(mean): --  RR: 18 (14 Feb 2018 06:53) (18 - 18)  SpO2: 97% (14 Feb 2018 06:53) (93% - 99%)    LABS:                        11.7   13.47 )-----------( 394      ( 13 Feb 2018 22:07 )             35.9     13 Feb 2018 22:07    138    |  102    |  18     ----------------------------<  157    4.5     |  22     |  0.71     Ca    10.0       13 Feb 2018 22:07    TPro  6.5    /  Alb  3.8    /  TBili  <0.2   /  DBili  x      /  AST  12     /  ALT  12     /  AlkPhos  48     13 Feb 2018 22:07    PT/INR - ( 12 Feb 2018 18:33 )   PT: 9.9 sec;   INR: 0.92 ratio         PTT - ( 12 Feb 2018 18:33 )  PTT:27.8 sec          Physical Examination:    General: No acute distress. Cushingoid face     Neck -c concave tracheostomy scar, no lateral scarring, otherwise soft    PULM: occasional ronchi anteriorly,    CVS: S1, S2    ABD: Soft, nondistended, nontender, normoactive bowel sounds, no masses    EXT: No edema, nontender    SKIN: Warm and well perfused, no rashes noted.    NEURO: Alert, oriented, interactive, nonfocal      laryngoscopy - dx dysphagia  scope #3 - b/l vocal folds mobile but incomplete excursion b/l, mild LPR changes

## 2018-02-14 NOTE — PHYSICAL THERAPY INITIAL EVALUATION ADULT - GENERAL OBSERVATIONS, REHAB EVAL
Pt rec'd supine in PACU in NAD, VSS and monitored t/o session +rico +dressing to anterior neck C/D/I +PCA +O2 via NC

## 2018-02-14 NOTE — BRIEF OPERATIVE NOTE - PROCEDURE
<<-----Click on this checkbox to enter Procedure Anterior cervical discectomy with fusion  02/14/2018  ACDF c34 c45, c56, and c67  Active  NMEHAN

## 2018-02-14 NOTE — PROGRESS NOTE ADULT - SUBJECTIVE AND OBJECTIVE BOX
Follow-up Pulm Progress Note    Extubated in OR  99% on 4L NC  c/o pain  denies dyspnea     Medications:  MEDICATIONS  (STANDING):  ALBUTerol/ipratropium for Nebulization 3 milliLiter(s) Nebulizer every 6 hours  ALPRAZolam 0.25 milliGRAM(s) Oral two times a day  ceFAZolin   IVPB 2000 milliGRAM(s) IV Intermittent every 8 hours  FLUoxetine 40 milliGRAM(s) Oral daily  HYDROmorphone PCA (1 mG/mL) 30 milliLiter(s) PCA Continuous PCA Continuous  influenza   Vaccine 0.5 milliLiter(s) IntraMuscular once  nortriptyline 50 milliGRAM(s) Oral daily  pregabalin 100 milliGRAM(s) Oral three times a day  propranolol LA 60 milliGRAM(s) Oral daily  sodium chloride 0.9% with potassium chloride 20 mEq/L 1000 milliLiter(s) (75 mL/Hr) IV Continuous <Continuous>    MEDICATIONS  (PRN):  acetaminophen   Tablet. 650 milliGRAM(s) Oral every 6 hours PRN Mild Pain (1 - 3)  acetaminophen  IVPB. 1000 milliGRAM(s) IV Intermittent once PRN Moderate Pain (4 - 6)  bisacodyl 5 milliGRAM(s) Oral daily PRN Constipation  diazepam    Tablet 5 milliGRAM(s) Oral every 8 hours PRN Muscle spasms  HYDROmorphone PCA (1 mG/mL) Rescue Clinician Bolus 0.5 milliGRAM(s) IV Push every 15 minutes PRN for Pain Scale GREATER THAN 6  naloxone Injectable 0.1 milliGRAM(s) IV Push every 3 minutes PRN For ANY of the following changes in patient status:  A. RR LESS THAN 10 breaths per minute, B. Oxygen saturation LESS THAN 90%, C. Sedation score of 6  ondansetron   Disintegrating Tablet 4 milliGRAM(s) Oral every 6 hours PRN Nausea  ondansetron Injectable 4 milliGRAM(s) IV Push once PRN Nausea and/or Vomiting  senna 2 Tablet(s) Oral at bedtime PRN Constipation          Vital Signs Last 24 Hrs  T(C): 36 (14 Feb 2018 11:45), Max: 36.7 (14 Feb 2018 00:02)  T(F): 96.8 (14 Feb 2018 11:45), Max: 98.1 (14 Feb 2018 00:02)  HR: 89 (14 Feb 2018 13:00) (76 - 98)  BP: 155/92 (14 Feb 2018 13:00) (116/78 - 168/88)  BP(mean): 116 (14 Feb 2018 13:00) (110 - 122)  RR: 15 (14 Feb 2018 13:00) (15 - 18)  SpO2: 98% (14 Feb 2018 13:00) (93% - 99%) on 4L NC    ABG - ( 13 Feb 2018 22:31 )  pH: 7.46  /  pCO2: 38    /  pO2: 68    / HCO3: 26    / Base Excess: 2.9   /  SaO2: 93                    02-13 @ 07:01  -  02-14 @ 07:00  --------------------------------------------------------  IN: 720 mL / OUT: 0 mL / NET: 720 mL          LABS:                        11.7   13.47 )-----------( 394      ( 13 Feb 2018 22:07 )             35.9     02-13    138  |  102  |  18  ----------------------------<  157<H>  4.5   |  22  |  0.71    Ca    10.0      13 Feb 2018 22:07    TPro  6.5  /  Alb  3.8  /  TBili  <0.2  /  DBili  x   /  AST  12  /  ALT  12  /  AlkPhos  48  02-13          CAPILLARY BLOOD GLUCOSE        PT/INR - ( 12 Feb 2018 18:33 )   PT: 9.9 sec;   INR: 0.92 ratio         PTT - ( 12 Feb 2018 18:33 )  PTT:27.8 sec        MYLES -- 02-13 @ 22:07  Anti SS-1 --  Anti SS-2 --  Anti RNP --  RF <7.0 02-13 @ 22:07    Atypical ANCA -- 02-13 @ 22:07  c-ANCA titer -- 02-13 @ 22:07  c-ANCA -- 02-13 @ 22:07  p-ANCA -- 02-13 @ 22:07              CULTURES: (if applicable)          Physical Examination:  PULM: Clear to auscultation bilaterally, no significant sputum production  CVS: S1, S2 heard    RADIOLOGY REVIEWED  CT chest: bilateral GGO with peripheral reticulation, upper lobe predominant Follow-up Pulm Progress Note    Extubated in OR  99% on 4L NC  c/o pain  denies dyspnea     Medications:  MEDICATIONS  (STANDING):  ALBUTerol/ipratropium for Nebulization 3 milliLiter(s) Nebulizer every 6 hours  ALPRAZolam 0.25 milliGRAM(s) Oral two times a day  ceFAZolin   IVPB 2000 milliGRAM(s) IV Intermittent every 8 hours  FLUoxetine 40 milliGRAM(s) Oral daily  HYDROmorphone PCA (1 mG/mL) 30 milliLiter(s) PCA Continuous PCA Continuous  influenza   Vaccine 0.5 milliLiter(s) IntraMuscular once  nortriptyline 50 milliGRAM(s) Oral daily  pregabalin 100 milliGRAM(s) Oral three times a day  propranolol LA 60 milliGRAM(s) Oral daily  sodium chloride 0.9% with potassium chloride 20 mEq/L 1000 milliLiter(s) (75 mL/Hr) IV Continuous <Continuous>    MEDICATIONS  (PRN):  acetaminophen   Tablet. 650 milliGRAM(s) Oral every 6 hours PRN Mild Pain (1 - 3)  acetaminophen  IVPB. 1000 milliGRAM(s) IV Intermittent once PRN Moderate Pain (4 - 6)  bisacodyl 5 milliGRAM(s) Oral daily PRN Constipation  diazepam    Tablet 5 milliGRAM(s) Oral every 8 hours PRN Muscle spasms  HYDROmorphone PCA (1 mG/mL) Rescue Clinician Bolus 0.5 milliGRAM(s) IV Push every 15 minutes PRN for Pain Scale GREATER THAN 6  naloxone Injectable 0.1 milliGRAM(s) IV Push every 3 minutes PRN For ANY of the following changes in patient status:  A. RR LESS THAN 10 breaths per minute, B. Oxygen saturation LESS THAN 90%, C. Sedation score of 6  ondansetron   Disintegrating Tablet 4 milliGRAM(s) Oral every 6 hours PRN Nausea  ondansetron Injectable 4 milliGRAM(s) IV Push once PRN Nausea and/or Vomiting  senna 2 Tablet(s) Oral at bedtime PRN Constipation    Vital Signs Last 24 Hrs  T(C): 36 (14 Feb 2018 11:45), Max: 36.7 (14 Feb 2018 00:02)  T(F): 96.8 (14 Feb 2018 11:45), Max: 98.1 (14 Feb 2018 00:02)  HR: 89 (14 Feb 2018 13:00) (76 - 98)  BP: 155/92 (14 Feb 2018 13:00) (116/78 - 168/88)  BP(mean): 116 (14 Feb 2018 13:00) (110 - 122)  RR: 15 (14 Feb 2018 13:00) (15 - 18)  SpO2: 98% (14 Feb 2018 13:00) (93% - 99%) on 4L NC    ABG - ( 13 Feb 2018 22:31 )  pH: 7.46  /  pCO2: 38    /  pO2: 68    / HCO3: 26    / Base Excess: 2.9   /  SaO2: 93        02-13 @ 07:01  -  02-14 @ 07:00  --------------------------------------------------------  IN: 720 mL / OUT: 0 mL / NET: 720 mL    LABS:                        11.7   13.47 )-----------( 394      ( 13 Feb 2018 22:07 )             35.9     02-13    138  |  102  |  18  ----------------------------<  157<H>  4.5   |  22  |  0.71    Ca    10.0      13 Feb 2018 22:07    TPro  6.5  /  Alb  3.8  /  TBili  <0.2  /  DBili  x   /  AST  12  /  ALT  12  /  AlkPhos  48  02-13    CAPILLARY BLOOD GLUCOSE    PT/INR - ( 12 Feb 2018 18:33 )   PT: 9.9 sec;   INR: 0.92 ratio         PTT - ( 12 Feb 2018 18:33 )  PTT:27.8 sec        MYLES -- 02-13 @ 22:07  Anti SS-1 --  Anti SS-2 --  Anti RNP --  RF <7.0 02-13 @ 22:07    Atypical ANCA -- 02-13 @ 22:07  c-ANCA titer -- 02-13 @ 22:07  c-ANCA -- 02-13 @ 22:07  p-ANCA -- 02-13 @ 22:07    CULTURES: (if applicable)    Physical Examination:  PULM: Clear to auscultation bilaterally, no significant sputum production  CVS: S1, S2 heard    RADIOLOGY REVIEWED  CT chest: bilateral GGO with peripheral reticulation, upper lobe predominant

## 2018-02-14 NOTE — CONSULT NOTE ADULT - ASSESSMENT
Worsening cervical spondylotic myelopathy going for anterior approach to the cervical spine - 4 level. pt witrh previous trach with pre-existing dysphagia and dysphonia with b/l vocal fold mobility. imaging reviewed   risks benefits and alternatives discussed at length - bleeding/ hematoma that can compress airway, numbness, iknjury to cranial nerves including RLN , esophageal injury/ perf causing possible severe infection, need for further surgery, dysphagia pharyngeal injury, dysphonia, SOB, chronic pain all risks increased due to previous neck surgery and scarring. she understood risks and elected to proceed

## 2018-02-15 LAB
ANA TITR SER: NEGATIVE — SIGNIFICANT CHANGE UP
ANION GAP SERPL CALC-SCNC: 15 MMOL/L — SIGNIFICANT CHANGE UP (ref 5–17)
BUN SERPL-MCNC: 12 MG/DL — SIGNIFICANT CHANGE UP (ref 7–23)
CALCIUM SERPL-MCNC: 9.8 MG/DL — SIGNIFICANT CHANGE UP (ref 8.4–10.5)
CHLORIDE SERPL-SCNC: 101 MMOL/L — SIGNIFICANT CHANGE UP (ref 96–108)
CO2 SERPL-SCNC: 23 MMOL/L — SIGNIFICANT CHANGE UP (ref 22–31)
CREAT SERPL-MCNC: 0.55 MG/DL — SIGNIFICANT CHANGE UP (ref 0.5–1.3)
GLUCOSE SERPL-MCNC: 156 MG/DL — HIGH (ref 70–99)
HCT VFR BLD CALC: 38.9 % — SIGNIFICANT CHANGE UP (ref 34.5–45)
HGB BLD-MCNC: 13 G/DL — SIGNIFICANT CHANGE UP (ref 11.5–15.5)
MCHC RBC-ENTMCNC: 33.4 GM/DL — SIGNIFICANT CHANGE UP (ref 32–36)
MCHC RBC-ENTMCNC: 38.6 PG — HIGH (ref 27–34)
MCV RBC AUTO: 116 FL — HIGH (ref 80–100)
PLATELET # BLD AUTO: 393 K/UL — SIGNIFICANT CHANGE UP (ref 150–400)
POTASSIUM SERPL-MCNC: 4.2 MMOL/L — SIGNIFICANT CHANGE UP (ref 3.5–5.3)
POTASSIUM SERPL-SCNC: 4.2 MMOL/L — SIGNIFICANT CHANGE UP (ref 3.5–5.3)
RBC # BLD: 3.37 M/UL — LOW (ref 3.8–5.2)
RBC # FLD: 13.5 % — SIGNIFICANT CHANGE UP (ref 10.3–14.5)
SODIUM SERPL-SCNC: 139 MMOL/L — SIGNIFICANT CHANGE UP (ref 135–145)
WBC # BLD: 19.4 K/UL — HIGH (ref 3.8–10.5)
WBC # FLD AUTO: 19.4 K/UL — HIGH (ref 3.8–10.5)

## 2018-02-15 RX ORDER — NALOXONE HYDROCHLORIDE 4 MG/.1ML
0.1 SPRAY NASAL
Qty: 0 | Refills: 0 | Status: DISCONTINUED | OUTPATIENT
Start: 2018-02-15 | End: 2018-02-16

## 2018-02-15 RX ORDER — DEXAMETHASONE 0.5 MG/5ML
4 ELIXIR ORAL EVERY 8 HOURS
Qty: 0 | Refills: 0 | Status: DISCONTINUED | OUTPATIENT
Start: 2018-02-15 | End: 2018-02-16

## 2018-02-15 RX ORDER — HYDROMORPHONE HYDROCHLORIDE 2 MG/ML
30 INJECTION INTRAMUSCULAR; INTRAVENOUS; SUBCUTANEOUS
Qty: 0 | Refills: 0 | Status: DISCONTINUED | OUTPATIENT
Start: 2018-02-15 | End: 2018-02-16

## 2018-02-15 RX ORDER — OXYCODONE HYDROCHLORIDE 5 MG/1
5 TABLET ORAL EVERY 4 HOURS
Qty: 0 | Refills: 0 | Status: DISCONTINUED | OUTPATIENT
Start: 2018-02-15 | End: 2018-02-15

## 2018-02-15 RX ORDER — DEXTROSE MONOHYDRATE, SODIUM CHLORIDE, AND POTASSIUM CHLORIDE 50; .745; 4.5 G/1000ML; G/1000ML; G/1000ML
1000 INJECTION, SOLUTION INTRAVENOUS
Qty: 0 | Refills: 0 | Status: DISCONTINUED | OUTPATIENT
Start: 2018-02-15 | End: 2018-02-16

## 2018-02-15 RX ORDER — HYDROMORPHONE HYDROCHLORIDE 2 MG/ML
0.5 INJECTION INTRAMUSCULAR; INTRAVENOUS; SUBCUTANEOUS
Qty: 0 | Refills: 0 | Status: DISCONTINUED | OUTPATIENT
Start: 2018-02-15 | End: 2018-02-16

## 2018-02-15 RX ORDER — HYDROMORPHONE HYDROCHLORIDE 2 MG/ML
30 INJECTION INTRAMUSCULAR; INTRAVENOUS; SUBCUTANEOUS
Qty: 0 | Refills: 0 | Status: DISCONTINUED | OUTPATIENT
Start: 2018-02-15 | End: 2018-02-15

## 2018-02-15 RX ORDER — OXYCODONE HYDROCHLORIDE 5 MG/1
10 TABLET ORAL EVERY 4 HOURS
Qty: 0 | Refills: 0 | Status: DISCONTINUED | OUTPATIENT
Start: 2018-02-15 | End: 2018-02-15

## 2018-02-15 RX ADMIN — HYDROMORPHONE HYDROCHLORIDE 30 MILLILITER(S): 2 INJECTION INTRAMUSCULAR; INTRAVENOUS; SUBCUTANEOUS at 00:05

## 2018-02-15 RX ADMIN — Medication 40 MILLIGRAM(S): at 14:00

## 2018-02-15 RX ADMIN — HYDROMORPHONE HYDROCHLORIDE 30 MILLILITER(S): 2 INJECTION INTRAMUSCULAR; INTRAVENOUS; SUBCUTANEOUS at 14:47

## 2018-02-15 RX ADMIN — Medication 5 MILLIGRAM(S): at 03:00

## 2018-02-15 RX ADMIN — Medication 4 MILLIGRAM(S): at 06:44

## 2018-02-15 RX ADMIN — HYDROMORPHONE HYDROCHLORIDE 30 MILLILITER(S): 2 INJECTION INTRAMUSCULAR; INTRAVENOUS; SUBCUTANEOUS at 08:11

## 2018-02-15 RX ADMIN — HYDROMORPHONE HYDROCHLORIDE 30 MILLILITER(S): 2 INJECTION INTRAMUSCULAR; INTRAVENOUS; SUBCUTANEOUS at 06:30

## 2018-02-15 RX ADMIN — HYDROMORPHONE HYDROCHLORIDE 30 MILLILITER(S): 2 INJECTION INTRAMUSCULAR; INTRAVENOUS; SUBCUTANEOUS at 14:24

## 2018-02-15 RX ADMIN — Medication 3 MILLILITER(S): at 09:58

## 2018-02-15 RX ADMIN — Medication 60 MILLIGRAM(S): at 06:43

## 2018-02-15 RX ADMIN — Medication 4 MILLIGRAM(S): at 12:49

## 2018-02-15 RX ADMIN — Medication 100 MILLIGRAM(S): at 16:29

## 2018-02-15 RX ADMIN — Medication 100 MILLIGRAM(S): at 06:43

## 2018-02-15 RX ADMIN — ENOXAPARIN SODIUM 40 MILLIGRAM(S): 100 INJECTION SUBCUTANEOUS at 21:23

## 2018-02-15 RX ADMIN — DEXTROSE MONOHYDRATE, SODIUM CHLORIDE, AND POTASSIUM CHLORIDE 75 MILLILITER(S): 50; .745; 4.5 INJECTION, SOLUTION INTRAVENOUS at 08:00

## 2018-02-15 RX ADMIN — NORTRIPTYLINE HYDROCHLORIDE 50 MILLIGRAM(S): 10 CAPSULE ORAL at 21:23

## 2018-02-15 RX ADMIN — HYDROMORPHONE HYDROCHLORIDE 30 MILLILITER(S): 2 INJECTION INTRAMUSCULAR; INTRAVENOUS; SUBCUTANEOUS at 05:15

## 2018-02-15 RX ADMIN — HYDROMORPHONE HYDROCHLORIDE 30 MILLILITER(S): 2 INJECTION INTRAMUSCULAR; INTRAVENOUS; SUBCUTANEOUS at 19:14

## 2018-02-15 RX ADMIN — HYDROMORPHONE HYDROCHLORIDE 0.5 MILLIGRAM(S): 2 INJECTION INTRAMUSCULAR; INTRAVENOUS; SUBCUTANEOUS at 06:20

## 2018-02-15 RX ADMIN — HYDROMORPHONE HYDROCHLORIDE 30 MILLILITER(S): 2 INJECTION INTRAMUSCULAR; INTRAVENOUS; SUBCUTANEOUS at 09:26

## 2018-02-15 RX ADMIN — Medication 0.25 MILLIGRAM(S): at 10:54

## 2018-02-15 RX ADMIN — Medication 100 MILLIGRAM(S): at 21:23

## 2018-02-15 NOTE — PROGRESS NOTE ADULT - PROBLEM SELECTOR PLAN 1
1 Out of bed   2 continue pt   3 continue pca   4 valium for muscle spasm.   5 dvt ppx sql scds   6 continue prozac and pamelor   7 continue propanalol   8 continue hemovac for now   9 possible dc home in am

## 2018-02-15 NOTE — PROGRESS NOTE ADULT - SUBJECTIVE AND OBJECTIVE BOX
SUBJECTIVE: Patient was seen and evalauted at bedside. Patient is resting comfortably in bed and is in no new acute distress      OVERNIGHT EVENTS:  none     Vital Signs Last 24 Hrs  T(C): 36.6 (15 Feb 2018 08:00), Max: 36.7 (15 Feb 2018 04:00)  T(F): 97.9 (15 Feb 2018 08:00), Max: 98.1 (15 Feb 2018 04:00)  HR: 100 (15 Feb 2018 08:00) (75 - 101)  BP: 134/87 (15 Feb 2018 08:00) (108/64 - 168/88)  BP(mean): 91 (15 Feb 2018 06:30) (80 - 122)  RR: 16 (15 Feb 2018 08:00) (15 - 31)  SpO2: 94% (15 Feb 2018 08:00) (92% - 99%)    PHYSICAL EXAM:     General: No Acute Distress     Neurological: Awake, alert oriented to person, place and time, Following Commands, PERRL, EOMI, Face Symmetrical, Speech Fluent, Moving all extremities, Muscle Strength normal in all four extremities, No Drift, Sensation to Light Touch Intact    Pulmonary: Clear to Auscultation, No Rales, No Rhonchi, No Wheezes     Cardiovascular: S1, S2, Regular Rate and Rhythm     Gastrointestinal: Soft, Nontender, Nondistended     Extremities: No calf tenderness     Incision: clean and dry     LABS:                        13.0   19.4  )-----------( 393      ( 15 Feb 2018 02:50 )             38.9    02-15    139  |  101  |  12  ----------------------------<  156<H>  4.2   |  23  |  0.55    Ca    9.8      15 Feb 2018 02:50  Phos  3.0     02-14  Mg     2.0     02-14    TPro  6.5  /  Alb  3.8  /  TBili  <0.2  /  DBili  x   /  AST  12  /  ALT  12  /  AlkPhos  48  02-13        02-14 @ 07:01  -  02-15 @ 07:00  --------------------------------------------------------  IN: 2115 mL / OUT: 1890 mL / NET: 225 mL    02-15 @ 07:01  -  02-15 @ 10:06  --------------------------------------------------------  IN: 275 mL / OUT: 0 mL / NET: 275 mL      DRAINS:     MEDICATIONS:  Antibiotics:    Neuro:  acetaminophen   Tablet. 650 milliGRAM(s) Oral every 6 hours PRN Mild Pain (1 - 3)  ALPRAZolam 0.25 milliGRAM(s) Oral two times a day  diazepam    Tablet 5 milliGRAM(s) Oral every 8 hours PRN Muscle spasms  diazepam    Tablet 5 milliGRAM(s) Oral once  FLUoxetine 40 milliGRAM(s) Oral daily  HYDROmorphone PCA (1 mG/mL) 30 milliLiter(s) PCA Continuous PCA Continuous  HYDROmorphone PCA (1 mG/mL) Rescue Clinician Bolus 0.5 milliGRAM(s) IV Push every 15 minutes PRN for Pain Scale GREATER THAN 6  nortriptyline 50 milliGRAM(s) Oral daily  ondansetron   Disintegrating Tablet 4 milliGRAM(s) Oral every 6 hours PRN Nausea  ondansetron Injectable 4 milliGRAM(s) IV Push once PRN Nausea and/or Vomiting  pregabalin 100 milliGRAM(s) Oral three times a day    Cardiac:  propranolol LA 60 milliGRAM(s) Oral daily    Pulm:  ALBUTerol/ipratropium for Nebulization 3 milliLiter(s) Nebulizer every 6 hours    GI/:  bisacodyl 5 milliGRAM(s) Oral daily PRN Constipation  senna 2 Tablet(s) Oral at bedtime PRN Constipation    Other:   dexamethasone  Injectable 4 milliGRAM(s) IV Push every 6 hours  enoxaparin Injectable 40 milliGRAM(s) SubCutaneous at bedtime  influenza   Vaccine 0.5 milliLiter(s) IntraMuscular once  naloxone Injectable 0.1 milliGRAM(s) IV Push every 3 minutes PRN For ANY of the following changes in patient status:  A. RR LESS THAN 10 breaths per minute, B. Oxygen saturation LESS THAN 90%, C. Sedation score of 6  sodium chloride 0.9% with potassium chloride 20 mEq/L 1000 milliLiter(s) IV Continuous <Continuous>    DIET: [] Regular [] CCD [] Renal [] Puree [] Dysphagia [] Tube Feeds: regular     IMAGING:  no new imaging

## 2018-02-15 NOTE — PROGRESS NOTE ADULT - PROBLEM SELECTOR PLAN 1
Bilateral GGO with mild peripheral reticulation, upper lobe predominant likely represent early fibrosis as sequelae of  or ARDS   -s/p stress dose hydrocortisone yesterday  -Now on Decadron 4mg q6 per NSx  -Resume Prednisone 20mg qd (home dose) when decadron completed  -Extubated on 2/14 in OR, no post op respiratory complications

## 2018-02-15 NOTE — PROGRESS NOTE ADULT - SUBJECTIVE AND OBJECTIVE BOX
Day 1 of Anesthesia Pain Management Service    SUBJECTIVE: Patient is doing well with IV PCA    Pain Scale Score:	[X] Refer to charted pain scores    THERAPY:    [ ] IV PCA Morphine		[ ] 5 mg/mL	[ ] 1 mg/mL  [X] IV PCA Hydromorphone	[ ] 5 mg/mL	[X] 1 mg/mL  [ ] IV PCA Fentanyl		[ ] 50 micrograms/mL    Demand dose: 0.25 mg     Lockout: 6 minutes   Continuous Rate: 0 mg/hr  4 Hour Limit: 4 mg    MEDICATIONS  (STANDING):  ALBUTerol/ipratropium for Nebulization 3 milliLiter(s) Nebulizer every 6 hours  ALPRAZolam 0.25 milliGRAM(s) Oral two times a day  dexamethasone  Injectable 4 milliGRAM(s) IV Push every 6 hours  diazepam    Tablet 5 milliGRAM(s) Oral once  enoxaparin Injectable 40 milliGRAM(s) SubCutaneous at bedtime  FLUoxetine 40 milliGRAM(s) Oral daily  HYDROmorphone PCA (1 mG/mL) 30 milliLiter(s) PCA Continuous PCA Continuous  influenza   Vaccine 0.5 milliLiter(s) IntraMuscular once  nortriptyline 50 milliGRAM(s) Oral daily  pregabalin 100 milliGRAM(s) Oral three times a day  propranolol LA 60 milliGRAM(s) Oral daily  sodium chloride 0.9% with potassium chloride 20 mEq/L 1000 milliLiter(s) (75 mL/Hr) IV Continuous <Continuous>    MEDICATIONS  (PRN):  acetaminophen   Tablet. 650 milliGRAM(s) Oral every 6 hours PRN Mild Pain (1 - 3)  bisacodyl 5 milliGRAM(s) Oral daily PRN Constipation  diazepam    Tablet 5 milliGRAM(s) Oral every 8 hours PRN Muscle spasms  HYDROmorphone PCA (1 mG/mL) Rescue Clinician Bolus 0.5 milliGRAM(s) IV Push every 15 minutes PRN for Pain Scale GREATER THAN 6  naloxone Injectable 0.1 milliGRAM(s) IV Push every 3 minutes PRN For ANY of the following changes in patient status:  A. RR LESS THAN 10 breaths per minute, B. Oxygen saturation LESS THAN 90%, C. Sedation score of 6  ondansetron   Disintegrating Tablet 4 milliGRAM(s) Oral every 6 hours PRN Nausea  ondansetron Injectable 4 milliGRAM(s) IV Push once PRN Nausea and/or Vomiting  senna 2 Tablet(s) Oral at bedtime PRN Constipation      OBJECTIVE:    Sedation Score:	[ X] Alert	[ ] Drowsy 	[ ] Arousable	[ ] Asleep	[ ] Unresponsive    Side Effects:	[X ] None	[ ] Nausea	[ ] Vomiting	[ ] Pruritus  		[ ] Other:    Vital Signs Last 24 Hrs  T(C): 36.6 (15 Feb 2018 08:00), Max: 36.7 (15 Feb 2018 04:00)  T(F): 97.9 (15 Feb 2018 08:00), Max: 98.1 (15 Feb 2018 04:00)  HR: 100 (15 Feb 2018 08:00) (75 - 101)  BP: 134/87 (15 Feb 2018 08:00) (108/64 - 168/88)  BP(mean): 91 (15 Feb 2018 06:30) (80 - 122)  RR: 16 (15 Feb 2018 08:00) (15 - 31)  SpO2: 94% (15 Feb 2018 08:00) (92% - 99%)    ASSESSMENT/ PLAN    Therapy to  be:               [] Continued   [X] Discontinued   [ ] Changed to PRN Analgesics    Documentation and Verification of current medications:   [X] Done	[ ] Not done, not eligible    Comments:

## 2018-02-15 NOTE — PROGRESS NOTE ADULT - SUBJECTIVE AND OBJECTIVE BOX
Follow-up Pulm Progress Note    No new respiratory events overnight.  Denies SOB/CP.   94% on RA    Medications:  MEDICATIONS  (STANDING):  ALBUTerol/ipratropium for Nebulization 3 milliLiter(s) Nebulizer every 6 hours  ALPRAZolam 0.25 milliGRAM(s) Oral two times a day  dexamethasone  Injectable 4 milliGRAM(s) IV Push every 6 hours  enoxaparin Injectable 40 milliGRAM(s) SubCutaneous at bedtime  FLUoxetine 40 milliGRAM(s) Oral daily  HYDROmorphone PCA (1 mG/mL) 30 milliLiter(s) PCA Continuous PCA Continuous  influenza   Vaccine 0.5 milliLiter(s) IntraMuscular once  nortriptyline 50 milliGRAM(s) Oral daily  pregabalin 100 milliGRAM(s) Oral three times a day  propranolol LA 60 milliGRAM(s) Oral daily  sodium chloride 0.9% with potassium chloride 20 mEq/L 1000 milliLiter(s) (40 mL/Hr) IV Continuous <Continuous>    MEDICATIONS  (PRN):  acetaminophen   Tablet. 650 milliGRAM(s) Oral every 6 hours PRN Mild Pain (1 - 3)  bisacodyl 5 milliGRAM(s) Oral daily PRN Constipation  diazepam    Tablet 5 milliGRAM(s) Oral every 8 hours PRN Muscle spasms  HYDROmorphone PCA (1 mG/mL) Rescue Clinician Bolus 0.5 milliGRAM(s) IV Push every 15 minutes PRN for Pain Scale GREATER THAN 6  naloxone Injectable 0.1 milliGRAM(s) IV Push every 3 minutes PRN For ANY of the following changes in patient status:  A. RR LESS THAN 10 breaths per minute, B. Oxygen saturation LESS THAN 90%, C. Sedation score of 6  ondansetron   Disintegrating Tablet 4 milliGRAM(s) Oral every 6 hours PRN Nausea  ondansetron Injectable 4 milliGRAM(s) IV Push once PRN Nausea and/or Vomiting  senna 2 Tablet(s) Oral at bedtime PRN Constipation          Vital Signs Last 24 Hrs  T(C): 36.6 (15 Feb 2018 12:00), Max: 36.7 (15 Feb 2018 04:00)  T(F): 97.9 (15 Feb 2018 12:00), Max: 98.1 (15 Feb 2018 04:00)  HR: 90 (15 Feb 2018 12:00) (75 - 101)  BP: 150/86 (15 Feb 2018 12:00) (108/64 - 162/86)  BP(mean): 91 (15 Feb 2018 06:30) (80 - 118)  RR: 18 (15 Feb 2018 12:00) (15 - 31)  SpO2: 94% (15 Feb 2018 12:00) (92% - 99%) on RA    ABG - ( 13 Feb 2018 22:31 )  pH: 7.46  /  pCO2: 38    /  pO2: 68    / HCO3: 26    / Base Excess: 2.9   /  SaO2: 93                    02-14 @ 07:01  -  02-15 @ 07:00  --------------------------------------------------------  IN: 2115 mL / OUT: 1890 mL / NET: 225 mL          LABS:                        13.0   19.4  )-----------( 393      ( 15 Feb 2018 02:50 )             38.9     02-15    139  |  101  |  12  ----------------------------<  156<H>  4.2   |  23  |  0.55    Ca    9.8      15 Feb 2018 02:50  Phos  3.0     02-14  Mg     2.0     02-14    TPro  6.5  /  Alb  3.8  /  TBili  <0.2  /  DBili  x   /  AST  12  /  ALT  12  /  AlkPhos  48  02-13          CAPILLARY BLOOD GLUCOSE                MYLES Negative 02-13 @ 22:07  Anti SS-1 --  Anti SS-2 --  Anti RNP --  RF <7.0 02-13 @ 22:07    Atypical ANCA -- 02-13 @ 22:07  c-ANCA titer -- 02-13 @ 22:07  c-ANCA -- 02-13 @ 22:07  p-ANCA -- 02-13 @ 22:07              CULTURES: (if applicable)          Physical Examination:  PULM: Decreased BS throughout, no wheeze  CVS: S1, S2 heard    RADIOLOGY REVIEWED  CT chest: < from: CT Chest No Cont (02.13.18 @ 14:31) >  CHEST:     LUNGS AND LARGE AIRWAYS: Patent central airways. Sutures are seen along   the apex of the right upper lobe. Diffuse groundglass opacities   predominantly within the upper lobes bilaterally. No pulmonary nodules.  PLEURA: No pleural effusion.  VESSELS: Within normal limits.  HEART: Heart size is normal. No pericardial effusion.  MEDIASTINUM AND KIANA: No lymphadenopathy.  CHEST WALL AND LOWER NECK: Within normal limits.  VISUALIZED UPPER ABDOMEN: Status post cholecystectomy.  BONES: Schmorl's node noted at the inferior endplate of T7 and superior   plate of T11.    IMPRESSION: Bilateral groundglass opacities and minimal peripheral   reticulation with subpleural sparing, with an upper lobe predominance.    Differential diagnosis includes hypersensitivity pneumonitis, early   interstitial fibrosis, or drug reaction. Organizing pneumonia or   posttreatment change can also have such an appearance. Recommend   submission of outside images. An addendum will be should accordingly.    Surgical sutures seen inferiorly right upper lobe consistent with prior   biopsy, please correlate with biopsy results available.    < end of copied text >

## 2018-02-16 ENCOUNTER — TRANSCRIPTION ENCOUNTER (OUTPATIENT)
Age: 48
End: 2018-02-16

## 2018-02-16 VITALS
SYSTOLIC BLOOD PRESSURE: 111 MMHG | OXYGEN SATURATION: 95 % | HEART RATE: 86 BPM | DIASTOLIC BLOOD PRESSURE: 68 MMHG | RESPIRATION RATE: 18 BRPM | TEMPERATURE: 98 F

## 2018-02-16 LAB
ANION GAP SERPL CALC-SCNC: 13 MMOL/L — SIGNIFICANT CHANGE UP (ref 5–17)
BUN SERPL-MCNC: 14 MG/DL — SIGNIFICANT CHANGE UP (ref 7–23)
CALCIUM SERPL-MCNC: 9.8 MG/DL — SIGNIFICANT CHANGE UP (ref 8.4–10.5)
CHLORIDE SERPL-SCNC: 103 MMOL/L — SIGNIFICANT CHANGE UP (ref 96–108)
CO2 SERPL-SCNC: 26 MMOL/L — SIGNIFICANT CHANGE UP (ref 22–31)
CREAT SERPL-MCNC: 0.35 MG/DL — LOW (ref 0.5–1.3)
GLUCOSE SERPL-MCNC: 81 MG/DL — SIGNIFICANT CHANGE UP (ref 70–99)
HCT VFR BLD CALC: 36.1 % — SIGNIFICANT CHANGE UP (ref 34.5–45)
HGB BLD-MCNC: 11.5 G/DL — SIGNIFICANT CHANGE UP (ref 11.5–15.5)
MCHC RBC-ENTMCNC: 31.9 GM/DL — LOW (ref 32–36)
MCHC RBC-ENTMCNC: 37.1 PG — HIGH (ref 27–34)
MCV RBC AUTO: 116.5 FL — HIGH (ref 80–100)
PLATELET # BLD AUTO: 425 K/UL — HIGH (ref 150–400)
POTASSIUM SERPL-MCNC: 4.6 MMOL/L — SIGNIFICANT CHANGE UP (ref 3.5–5.3)
POTASSIUM SERPL-SCNC: 4.6 MMOL/L — SIGNIFICANT CHANGE UP (ref 3.5–5.3)
RBC # BLD: 3.1 M/UL — LOW (ref 3.8–5.2)
RBC # FLD: 15.2 % — HIGH (ref 10.3–14.5)
SODIUM SERPL-SCNC: 142 MMOL/L — SIGNIFICANT CHANGE UP (ref 135–145)
WBC # BLD: 16.81 K/UL — HIGH (ref 3.8–10.5)
WBC # FLD AUTO: 16.81 K/UL — HIGH (ref 3.8–10.5)

## 2018-02-16 PROCEDURE — 82803 BLOOD GASES ANY COMBINATION: CPT

## 2018-02-16 PROCEDURE — 86901 BLOOD TYPING SEROLOGIC RH(D): CPT

## 2018-02-16 PROCEDURE — 84100 ASSAY OF PHOSPHORUS: CPT

## 2018-02-16 PROCEDURE — 82330 ASSAY OF CALCIUM: CPT

## 2018-02-16 PROCEDURE — 84702 CHORIONIC GONADOTROPIN TEST: CPT

## 2018-02-16 PROCEDURE — 96374 THER/PROPH/DIAG INJ IV PUSH: CPT

## 2018-02-16 PROCEDURE — 83735 ASSAY OF MAGNESIUM: CPT

## 2018-02-16 PROCEDURE — 36600 WITHDRAWAL OF ARTERIAL BLOOD: CPT

## 2018-02-16 PROCEDURE — 85027 COMPLETE CBC AUTOMATED: CPT

## 2018-02-16 PROCEDURE — 71250 CT THORAX DX C-: CPT

## 2018-02-16 PROCEDURE — 94640 AIRWAY INHALATION TREATMENT: CPT

## 2018-02-16 PROCEDURE — 86900 BLOOD TYPING SEROLOGIC ABO: CPT

## 2018-02-16 PROCEDURE — 93306 TTE W/DOPPLER COMPLETE: CPT

## 2018-02-16 PROCEDURE — 85014 HEMATOCRIT: CPT

## 2018-02-16 PROCEDURE — 84295 ASSAY OF SERUM SODIUM: CPT

## 2018-02-16 PROCEDURE — 80053 COMPREHEN METABOLIC PANEL: CPT

## 2018-02-16 PROCEDURE — 85610 PROTHROMBIN TIME: CPT

## 2018-02-16 PROCEDURE — 82947 ASSAY GLUCOSE BLOOD QUANT: CPT

## 2018-02-16 PROCEDURE — 86038 ANTINUCLEAR ANTIBODIES: CPT

## 2018-02-16 PROCEDURE — 76000 FLUOROSCOPY <1 HR PHYS/QHP: CPT

## 2018-02-16 PROCEDURE — 99024 POSTOP FOLLOW-UP VISIT: CPT

## 2018-02-16 PROCEDURE — C1889: CPT

## 2018-02-16 PROCEDURE — 82435 ASSAY OF BLOOD CHLORIDE: CPT

## 2018-02-16 PROCEDURE — 80048 BASIC METABOLIC PNL TOTAL CA: CPT

## 2018-02-16 PROCEDURE — 71045 X-RAY EXAM CHEST 1 VIEW: CPT | Mod: 26

## 2018-02-16 PROCEDURE — 71045 X-RAY EXAM CHEST 1 VIEW: CPT

## 2018-02-16 PROCEDURE — 83605 ASSAY OF LACTIC ACID: CPT

## 2018-02-16 PROCEDURE — 97161 PT EVAL LOW COMPLEX 20 MIN: CPT

## 2018-02-16 PROCEDURE — C1713: CPT

## 2018-02-16 PROCEDURE — 84132 ASSAY OF SERUM POTASSIUM: CPT

## 2018-02-16 PROCEDURE — 84703 CHORIONIC GONADOTROPIN ASSAY: CPT

## 2018-02-16 PROCEDURE — 86850 RBC ANTIBODY SCREEN: CPT

## 2018-02-16 PROCEDURE — 72125 CT NECK SPINE W/O DYE: CPT

## 2018-02-16 PROCEDURE — 85730 THROMBOPLASTIN TIME PARTIAL: CPT

## 2018-02-16 PROCEDURE — 99285 EMERGENCY DEPT VISIT HI MDM: CPT | Mod: 25

## 2018-02-16 PROCEDURE — 86431 RHEUMATOID FACTOR QUANT: CPT

## 2018-02-16 RX ORDER — OXYCODONE HYDROCHLORIDE 5 MG/1
1 TABLET ORAL
Qty: 0 | Refills: 0 | COMMUNITY
Start: 2018-02-16

## 2018-02-16 RX ORDER — OXYCODONE HYDROCHLORIDE 5 MG/1
1 TABLET ORAL
Qty: 30 | Refills: 0 | OUTPATIENT
Start: 2018-02-16 | End: 2018-02-20

## 2018-02-16 RX ORDER — CARISOPRODOL 250 MG
0 TABLET ORAL
Qty: 0 | Refills: 0 | COMMUNITY

## 2018-02-16 RX ORDER — FLUOXETINE HCL 10 MG
0 CAPSULE ORAL
Qty: 0 | Refills: 0 | COMMUNITY

## 2018-02-16 RX ORDER — OXYCODONE HYDROCHLORIDE 5 MG/1
10 TABLET ORAL EVERY 12 HOURS
Qty: 0 | Refills: 0 | Status: DISCONTINUED | OUTPATIENT
Start: 2018-02-16 | End: 2018-02-16

## 2018-02-16 RX ORDER — DEXAMETHASONE 0.5 MG/5ML
1 ELIXIR ORAL
Qty: 4 | Refills: 0 | OUTPATIENT
Start: 2018-02-16 | End: 2018-02-17

## 2018-02-16 RX ORDER — NORTRIPTYLINE HYDROCHLORIDE 10 MG/1
0 CAPSULE ORAL
Qty: 0 | Refills: 0 | COMMUNITY

## 2018-02-16 RX ORDER — OXYCODONE HYDROCHLORIDE 5 MG/1
10 TABLET ORAL EVERY 4 HOURS
Qty: 0 | Refills: 0 | Status: DISCONTINUED | OUTPATIENT
Start: 2018-02-16 | End: 2018-02-16

## 2018-02-16 RX ORDER — DEXAMETHASONE 0.5 MG/5ML
4 ELIXIR ORAL EVERY 12 HOURS
Qty: 0 | Refills: 0 | Status: DISCONTINUED | OUTPATIENT
Start: 2018-02-16 | End: 2018-02-16

## 2018-02-16 RX ORDER — ALPRAZOLAM 0.25 MG
0 TABLET ORAL
Qty: 0 | Refills: 0 | COMMUNITY

## 2018-02-16 RX ORDER — SENNA PLUS 8.6 MG/1
2 TABLET ORAL
Qty: 0 | Refills: 0 | COMMUNITY
Start: 2018-02-16

## 2018-02-16 RX ORDER — DEXAMETHASONE 0.5 MG/5ML
2 ELIXIR ORAL
Qty: 6 | Refills: 0 | OUTPATIENT
Start: 2018-02-16 | End: 2018-02-19

## 2018-02-16 RX ORDER — OXYCODONE HYDROCHLORIDE 5 MG/1
5 TABLET ORAL EVERY 4 HOURS
Qty: 0 | Refills: 0 | Status: DISCONTINUED | OUTPATIENT
Start: 2018-02-16 | End: 2018-02-16

## 2018-02-16 RX ORDER — CARISOPRODOL 250 MG
350 TABLET ORAL
Qty: 0 | Refills: 0 | COMMUNITY

## 2018-02-16 RX ADMIN — Medication 100 MILLIGRAM(S): at 05:16

## 2018-02-16 RX ADMIN — OXYCODONE HYDROCHLORIDE 10 MILLIGRAM(S): 5 TABLET ORAL at 11:09

## 2018-02-16 RX ADMIN — OXYCODONE HYDROCHLORIDE 10 MILLIGRAM(S): 5 TABLET ORAL at 13:23

## 2018-02-16 RX ADMIN — Medication 40 MILLIGRAM(S): at 11:09

## 2018-02-16 RX ADMIN — Medication 0.25 MILLIGRAM(S): at 05:16

## 2018-02-16 RX ADMIN — OXYCODONE HYDROCHLORIDE 10 MILLIGRAM(S): 5 TABLET ORAL at 11:41

## 2018-02-16 RX ADMIN — HYDROMORPHONE HYDROCHLORIDE 30 MILLILITER(S): 2 INJECTION INTRAMUSCULAR; INTRAVENOUS; SUBCUTANEOUS at 00:14

## 2018-02-16 RX ADMIN — OXYCODONE HYDROCHLORIDE 10 MILLIGRAM(S): 5 TABLET ORAL at 09:32

## 2018-02-16 RX ADMIN — Medication 60 MILLIGRAM(S): at 05:16

## 2018-02-16 RX ADMIN — HYDROMORPHONE HYDROCHLORIDE 30 MILLILITER(S): 2 INJECTION INTRAMUSCULAR; INTRAVENOUS; SUBCUTANEOUS at 04:13

## 2018-02-16 RX ADMIN — HYDROMORPHONE HYDROCHLORIDE 30 MILLILITER(S): 2 INJECTION INTRAMUSCULAR; INTRAVENOUS; SUBCUTANEOUS at 07:19

## 2018-02-16 RX ADMIN — OXYCODONE HYDROCHLORIDE 10 MILLIGRAM(S): 5 TABLET ORAL at 10:02

## 2018-02-16 RX ADMIN — Medication 100 MILLIGRAM(S): at 13:24

## 2018-02-16 NOTE — PROGRESS NOTE ADULT - SUBJECTIVE AND OBJECTIVE BOX
Subjective: Patient seen and examined. No new events except as noted.     SUBJECTIVE/ROS:  NOTE: my note from yesterday is missing not saved.    pt is doing well today  No chest pain, dyspnea, palpitation, or dizziness.       MEDICATIONS:  MEDICATIONS  (STANDING):  ALPRAZolam 0.25 milliGRAM(s) Oral two times a day  dexamethasone     Tablet 4 milliGRAM(s) Oral every 12 hours  enoxaparin Injectable 40 milliGRAM(s) SubCutaneous at bedtime  FLUoxetine 40 milliGRAM(s) Oral daily  influenza   Vaccine 0.5 milliLiter(s) IntraMuscular once  nortriptyline 50 milliGRAM(s) Oral daily  oxyCODONE  ER Tablet 10 milliGRAM(s) Oral every 12 hours  pregabalin 100 milliGRAM(s) Oral three times a day  propranolol LA 60 milliGRAM(s) Oral daily  sodium chloride 0.9% with potassium chloride 20 mEq/L 1000 milliLiter(s) (40 mL/Hr) IV Continuous <Continuous>      PHYSICAL EXAM:  T(C): 36.8 (02-16-18 @ 13:27), Max: 37 (02-16-18 @ 04:43)  HR: 86 (02-16-18 @ 13:27) (80 - 98)  BP: 111/68 (02-16-18 @ 13:27) (111/68 - 149/79)  RR: 18 (02-16-18 @ 13:27) (18 - 18)  SpO2: 95% (02-16-18 @ 13:27) (93% - 95%)  Wt(kg): --  I&O's Summary    15 Feb 2018 07:01  -  16 Feb 2018 07:00  --------------------------------------------------------  IN: 1070 mL / OUT: 25 mL / NET: 1045 mL    16 Feb 2018 07:01  -  16 Feb 2018 14:41  --------------------------------------------------------  IN: 520 mL / OUT: 0 mL / NET: 520 mL          Appearance: Normal	  HEENT:   Normal oral mucosa, PERRL, EOMI	  Cardiovascular: Normal S1 S2,    Murmur:   Neck: JVP normal  Respiratory: Lungs clear to auscultation  Gastrointestinal:  Soft, Non-tender, + BS	  Skin: normal   Neuro: No gross deficits.   Psychiatry:  Mood & affect appropriate  Ext: No edema      LABS/DATA:    CARDIAC MARKERS:                                11.5   16.81 )-----------( 425      ( 16 Feb 2018 07:29 )             36.1     02-16    142  |  103  |  14  ----------------------------<  81  4.6   |  26  |  0.35<L>    Ca    9.8      16 Feb 2018 07:33  Phos  3.0     02-14  Mg     2.0     02-14      proBNP:   Lipid Profile:   HgA1c:   TSH:     TELE:  EKG:

## 2018-02-16 NOTE — PROGRESS NOTE ADULT - ASSESSMENT
47 y/o F with PMH of anxiety, s/p abdominoplasty, s/p cholecystectomy, former smoker (10 pack yrs), SBO (medically managed-2016), extensive pulmonary history starting with respiratory failure 2nd ARDS (8/2012) requiring tracheostomy with subsequent decannulation (10/2012), respiratory failure requiring intubation (2016) subsequently diagnosed with  (12/2016) via RUL VATS wedge biopsy has been on chronic prednisone since that time at varying doses (most recently 20mg qd), requires supplemental oxygen nocturnally (2L NC). She follows with Dr. Mosher (pulm) and all of her intubations have been at [facility]. Patient has multiple flares per year where she becomes more hypoxic and hypophonic requiring increased steroid dosing and supplemental oxygen. She She has a history of persistent sinus tachycardia requiring r/o for PE in the past. She presents with 2 week history of significant LUE radiculopathy and numbness. MRI showed disc herniation from C3-4 to C6-C7 s/p C3-C7 ACDF 2/14.
49 yo female with 2 week history of significant LUE radiculopathy and numbness.  Denies any recent trauma, weakness. PMH positive for cryptogenic organizing PNA. MRI showed disc herniation from C3-4 to C6-C7.    PAST MEDICAL HISTORY   Cryptogenic organizing pneumonia  Herniated disc, cervical    PLAN:  Neuro: cont pain meds prn, for OR tomorrow- NPO @ midnight; pregnancy test ordered  Respiratory: patient instructed on incentive spirometer, cont prednisone as ordered, pulmonary consult noted/appreciated; private pulmonary records placed on chart including PFTs/chest CT of 12/17 results  CV: ECHO ordered (called dept to confirm to be done today), cardiology consult noted- need ECHO results for final risk stratification  Endocrine: will need stress doses of steroids periop            DVT ppx: [] SQH [] SQL and venodynes bilaterally; no chemoprophylaxis for OR tomorrow  GI: bowel regimen  PT/OT:  will order post op  Hospitalist medicine following  ENT evaluation for OR exposure    Will discuss with Dr Mynor Galan # [id number]
47 yo F s/p ACDF POD2 stable without voice changes or difficulty breathing. Pt admits to mild difficulty with swallowing but feels it is improving.
49 y/o F with PMH of anxiety, s/p abdominoplasty, s/p cholecystectomy, former smoker (10 pack yrs), SBO (medically managed-2016), extensive pulmonary history starting with respiratory failure 2nd ARDS (8/2012) requiring tracheostomy with subsequent decannulation (10/2012), respiratory failure requiring intubation (2016) subsequently diagnosed with  (12/2016) via RUL VATS wedge biopsy has been on chronic prednisone since that time at varying doses (most recently 20mg qd), requires supplemental oxygen nocturnally (2L NC). She follows with Dr. Mosher (pulm) and all of her intubations have been at [facility]. Patient has multiple flares per year where she becomes more hypoxic and hypophonic requiring increased steroid dosing and supplemental oxygen. She She has a history of persistent sinus tachycardia requiring r/o for PE in the past. She presents with 2 week history of significant LUE radiculopathy and numbness. MRI showed disc herniation from C3-4 to C6-C7 s/p C3-C7 ACDF 2/14.
Cryptogenic Organizing PNA  chronic   stable    s/p ACDF  fu with nsx    HTN  stable
HPI:  p (1480)     HPI: 49 yo female with 2 week history of significant LUE radiculopathy and numbness.  Denies any recent trauma, weakness. PMH positive for cryptogenic organizing PNA. MRI showed disc herniation from C3-4 to C6-C7.    s/p c3-c7 acdf- 2/14
HPI:  p (1480)     HPI: 49 yo female with 2 week history of significant LUE radiculopathy and numbness.  Denies any recent trauma, weakness. PMH positive for cryptogenic organizing PNA. MRI showed disc herniation from C3-4 to C6-C7.    s/p c3-c7 anterior cervical discectomy and fusion -2/14
Worsening cervical spondylotic myelopathy.  Recommend 4 level ACDF.  Need to arrange ENT for exposure because of previous tracheotomy.
47 y/o F with PMH of anxiety, s/p abdominoplasty, s/p cholecystectomy, former smoker (10 pack yrs), SBO (medically managed-2016), extensive pulmonary history starting with respiratory failure 2nd ARDS (8/2012) requiring tracheostomy with subsequent decannulation (10/2012), respiratory failure requiring intubation (2016) subsequently diagnosed with  (12/2016) via RUL VATS wedge biopsy has been on chronic prednisone since that time at varying doses (most recently 20mg qd), requires supplemental oxygen nocturnally (2L NC). She follows with Dr. Mosher (pulm) and [place] [facility] Patient has multiple flares per year where she becomes more hypoxic and hypophonic requiring increased steroid dosing and supplemental oxygen. She She has a history of persistent sinus tachycardia requiring r/o for PE in the past. She presents with 2 week history of significant LUE radiculopathy and numbness. MRI showed disc herniation from C3-4 to C6-C7 s/p C3-C7 ACDF 2/14.

## 2018-02-16 NOTE — DISCHARGE NOTE ADULT - NS AS ACTIVITY OBS
No Heavy lifting/straining/Walking-Outdoors allowed/Stairs allowed/Walking-Indoors allowed/Do not make important decisions/Do not drive or operate machinery/Showering allowed

## 2018-02-16 NOTE — PROGRESS NOTE ADULT - ATTENDING COMMENTS
OR arranged for am tomorrow.
as above  pt upset about being observed in PACU  I asked anesthesia to speak to pt and family
Pt OK for DC home from pulmonary perspective.  Continue O2. YUKO w pts pulmonary md next week.

## 2018-02-16 NOTE — PROGRESS NOTE ADULT - SUBJECTIVE AND OBJECTIVE BOX
Day 2 of Anesthesia Pain Management Service    SUBJECTIVE: Patient is doing well with IV PCA    Pain Scale Score:	[X] Refer to charted pain scores    THERAPY:    [ ] IV PCA Morphine		[ ] 5 mg/mL	[ ] 1 mg/mL  [X] IV PCA Hydromorphone	[ ] 5 mg/mL	[X] 1 mg/mL  [ ] IV PCA Fentanyl		[ ] 50 micrograms/mL    Demand dose: 0.2 mg     Lockout: 6 minutes   Continuous Rate: 0 mg/hr  4 Hour Limit: 4 mg    MEDICATIONS  (STANDING):  ALPRAZolam 0.25 milliGRAM(s) Oral two times a day  dexamethasone     Tablet 4 milliGRAM(s) Oral every 8 hours  enoxaparin Injectable 40 milliGRAM(s) SubCutaneous at bedtime  FLUoxetine 40 milliGRAM(s) Oral daily  HYDROmorphone PCA (1 mG/mL) 30 milliLiter(s) PCA Continuous PCA Continuous  influenza   Vaccine 0.5 milliLiter(s) IntraMuscular once  nortriptyline 50 milliGRAM(s) Oral daily  pregabalin 100 milliGRAM(s) Oral three times a day  propranolol LA 60 milliGRAM(s) Oral daily  sodium chloride 0.9% with potassium chloride 20 mEq/L 1000 milliLiter(s) (40 mL/Hr) IV Continuous <Continuous>    MEDICATIONS  (PRN):  acetaminophen   Tablet. 650 milliGRAM(s) Oral every 6 hours PRN Mild Pain (1 - 3)  bisacodyl 5 milliGRAM(s) Oral daily PRN Constipation  diazepam    Tablet 5 milliGRAM(s) Oral every 8 hours PRN Muscle spasms  HYDROmorphone PCA (1 mG/mL) Rescue Clinician Bolus 0.5 milliGRAM(s) IV Push every 15 minutes PRN for Pain Scale GREATER THAN 6  naloxone Injectable 0.1 milliGRAM(s) IV Push every 3 minutes PRN For ANY of the following changes in patient status:  A. RR LESS THAN 10 breaths per minute, B. Oxygen saturation LESS THAN 90%, C. Sedation score of 6  ondansetron   Disintegrating Tablet 4 milliGRAM(s) Oral every 6 hours PRN Nausea  senna 2 Tablet(s) Oral at bedtime PRN Constipation      OBJECTIVE:    Sedation Score:	[ X] Alert	[ ] Drowsy 	[ ] Arousable	[ ] Asleep	[ ] Unresponsive    Side Effects:	[X ] None	[ ] Nausea	[ ] Vomiting	[ ] Pruritus  		[ ] Other:    Vital Signs Last 24 Hrs  T(C): 37 (16 Feb 2018 04:43), Max: 37 (16 Feb 2018 04:43)  T(F): 98.6 (16 Feb 2018 04:43), Max: 98.6 (16 Feb 2018 04:43)  HR: 98 (16 Feb 2018 04:43) (80 - 100)  BP: 132/78 (16 Feb 2018 04:43) (126/81 - 150/86)  BP(mean): --  RR: 18 (16 Feb 2018 04:43) (16 - 18)  SpO2: 93% (16 Feb 2018 04:43) (93% - 95%)    ASSESSMENT/ PLAN    Therapy to  be:               [X] Continued   [ ] Discontinued   [ ] Changed to PRN Analgesics    Documentation and Verification of current medications:   [X] Done	[ ] Not done, not elligible    Comments: Day 2 of Anesthesia Pain Management Service    SUBJECTIVE: Patient is doing well with IV PCA    Pain Scale Score:	[X] Refer to charted pain scores    THERAPY:    [ ] IV PCA Morphine		[ ] 5 mg/mL	[ ] 1 mg/mL  [X] IV PCA Hydromorphone	[ ] 5 mg/mL	[X] 1 mg/mL  [ ] IV PCA Fentanyl		[ ] 50 micrograms/mL    Demand dose: 0.2 mg     Lockout: 6 minutes   Continuous Rate: 0 mg/hr  4 Hour Limit: 4 mg    MEDICATIONS  (STANDING):  ALPRAZolam 0.25 milliGRAM(s) Oral two times a day  dexamethasone     Tablet 4 milliGRAM(s) Oral every 8 hours  enoxaparin Injectable 40 milliGRAM(s) SubCutaneous at bedtime  FLUoxetine 40 milliGRAM(s) Oral daily  HYDROmorphone PCA (1 mG/mL) 30 milliLiter(s) PCA Continuous PCA Continuous  influenza   Vaccine 0.5 milliLiter(s) IntraMuscular once  nortriptyline 50 milliGRAM(s) Oral daily  pregabalin 100 milliGRAM(s) Oral three times a day  propranolol LA 60 milliGRAM(s) Oral daily  sodium chloride 0.9% with potassium chloride 20 mEq/L 1000 milliLiter(s) (40 mL/Hr) IV Continuous <Continuous>    MEDICATIONS  (PRN):  acetaminophen   Tablet. 650 milliGRAM(s) Oral every 6 hours PRN Mild Pain (1 - 3)  bisacodyl 5 milliGRAM(s) Oral daily PRN Constipation  diazepam    Tablet 5 milliGRAM(s) Oral every 8 hours PRN Muscle spasms  HYDROmorphone PCA (1 mG/mL) Rescue Clinician Bolus 0.5 milliGRAM(s) IV Push every 15 minutes PRN for Pain Scale GREATER THAN 6  naloxone Injectable 0.1 milliGRAM(s) IV Push every 3 minutes PRN For ANY of the following changes in patient status:  A. RR LESS THAN 10 breaths per minute, B. Oxygen saturation LESS THAN 90%, C. Sedation score of 6  ondansetron   Disintegrating Tablet 4 milliGRAM(s) Oral every 6 hours PRN Nausea  senna 2 Tablet(s) Oral at bedtime PRN Constipation      OBJECTIVE:    Sedation Score:	[ X] Alert	[ ] Drowsy 	[ ] Arousable	[ ] Asleep	[ ] Unresponsive    Side Effects:	[X ] None	[ ] Nausea	[ ] Vomiting	[ ] Pruritus  		[ ] Other:    Vital Signs Last 24 Hrs  T(C): 37 (16 Feb 2018 04:43), Max: 37 (16 Feb 2018 04:43)  T(F): 98.6 (16 Feb 2018 04:43), Max: 98.6 (16 Feb 2018 04:43)  HR: 98 (16 Feb 2018 04:43) (80 - 100)  BP: 132/78 (16 Feb 2018 04:43) (126/81 - 150/86)  BP(mean): --  RR: 18 (16 Feb 2018 04:43) (16 - 18)  SpO2: 93% (16 Feb 2018 04:43) (93% - 95%)    ASSESSMENT/ PLAN    Therapy to  be:               [] Continued   [X] Discontinued   [ ] Changed to PRN Analgesics    Documentation and Verification of current medications:   [X] Done	[ ] Not done, not elligible    Comments: Pain controlled, will d/c IV PCA and switch to PO Opioids per team

## 2018-02-16 NOTE — DISCHARGE NOTE ADULT - PATIENT PORTAL LINK FT
You can access the [facility] Patient Portal, offered by [facility], by registering with the following website: [email or web address]

## 2018-02-16 NOTE — PROGRESS NOTE ADULT - PROBLEM SELECTOR PLAN 1
s/p fusion   1 Out of bed   2 continue pt   3 prn pain meds   4 dvt ppx sql scds   5 continue valium for muscle spasm   6 decadron taper   7 continue prozac, pamelol  8 continue propranolol   9 regular diet   10 stool softeners   11 dc hemovac   12 dc home today

## 2018-02-16 NOTE — PROGRESS NOTE ADULT - SUBJECTIVE AND OBJECTIVE BOX
POD/STATUS POST/ENT ISSUE: s/p ACDF POD2    INTERVAL HPI: 47 yo F s/p ACDF with Dr. Lowe and Dr. Machado POD 2. Pt admits to tolerating a regular diet with mild difficulty in swallowing without pain, coughing or choking. Pt denies voice changes, difficulties breathing, n/v, fever, neck pain, numbness or tingling.    Vital Signs Last 24 Hrs  T(C): 37 (16 Feb 2018 04:43), Max: 37 (16 Feb 2018 04:43)  T(F): 98.6 (16 Feb 2018 04:43), Max: 98.6 (16 Feb 2018 04:43)  HR: 98 (16 Feb 2018 04:43) (80 - 98)  BP: 132/78 (16 Feb 2018 04:43) (126/81 - 150/86)  BP(mean): --  RR: 18 (16 Feb 2018 04:43) (18 - 18)  SpO2: 93% (16 Feb 2018 04:43) (93% - 95%)    PHYSICAL EXAM:  Gen: NAD, well-developed  Head: Normocephalic, Atraumatic  Eyes: PERRL, EOMI, no scleral injection  Nose: Nares bilaterally patent, no discharge  Mouth: Mucosa moist, tongue/uvula midline, oropharynx clear  Neck: Flat, supple, no lymphadenopathy, trachea midline, no masses, anterior neck dressing in place, clean and dry, LATOSHA drain from the left side putting out serosanguinous drainage [id number]  Resp: breathing easily, no stridor  CV: no peripheral edema/cyanosis    LABS:                        13.0   19.4  )-----------( 393      ( 15 Feb 2018 02:50 )             38.9

## 2018-02-16 NOTE — PROGRESS NOTE ADULT - PROBLEM SELECTOR PLAN 2
Outpt PFT's 1/2018 show decreased total lung capacity 2.59L (59% predicted) with decreased DLCO 46%
Outpt PFT's 1/2018 show decreased total lung capacity 2.59L (59% predicted) with decreased DLCO 46%  -Restrictive lung disease
Outpt PFT's 1/2018 show decreased total lung capacity 2.59L (59% predicted) with decreased DLCO 46%

## 2018-02-16 NOTE — DISCHARGE NOTE ADULT - MEDICATION SUMMARY - MEDICATIONS TO STOP TAKING
I will STOP taking the medications listed below when I get home from the hospital:    Nebo    Soma    Norco 10 mg-325 mg oral tablet  -- 1 tab(s) by mouth every 6 hours    Soma  -- 350 milligram(s) by mouth 2 times a day

## 2018-02-16 NOTE — PROGRESS NOTE ADULT - SUBJECTIVE AND OBJECTIVE BOX
SUBJECTIVE: Patient was seen and evaluated at bedside. Patient is resting comfortably in bed and is in no new acute distress.     OVERNIGHT EVENTS: none     Vital Signs Last 24 Hrs  T(C): 37 (16 Feb 2018 04:43), Max: 37 (16 Feb 2018 04:43)  T(F): 98.6 (16 Feb 2018 04:43), Max: 98.6 (16 Feb 2018 04:43)  HR: 98 (16 Feb 2018 04:43) (80 - 98)  BP: 132/78 (16 Feb 2018 04:43) (126/81 - 150/86)  BP(mean): --  RR: 18 (16 Feb 2018 04:43) (18 - 18)  SpO2: 93% (16 Feb 2018 04:43) (93% - 95%)    PHYSICAL EXAM:    General: No Acute Distress     Neurological: Awake, alert oriented to person, place and time, Following Commands, PERRL, EOMI, Face Symmetrical, Speech Fluent, Moving all extremities, Muscle Strength normal in all four extremities, No Drift, Sensation to Light Touch Intact    Pulmonary: Clear to Auscultation, No Rales, No Rhonchi, No Wheezes     Cardiovascular: S1, S2, Regular Rate and Rhythm     Gastrointestinal: Soft, Nontender, Nondistended     Extremities: No calf tenderness     Incision: clean and dry     LABS:                        11.5   16.81 )-----------( 425      ( 16 Feb 2018 07:29 )             36.1    02-16    142  |  103  |  14  ----------------------------<  81  4.6   |  26  |  0.35<L>    Ca    9.8      16 Feb 2018 07:33  Phos  3.0     02-14  Mg     2.0     02-14 02-15 @ 07:01  -  02-16 @ 07:00  --------------------------------------------------------  IN: 1070 mL / OUT: 25 mL / NET: 1045 mL      DRAINS: hemovac- 25 cc     MEDICATIONS:  Antibiotics:    Neuro:  acetaminophen   Tablet. 650 milliGRAM(s) Oral every 6 hours PRN Mild Pain (1 - 3)  ALPRAZolam 0.25 milliGRAM(s) Oral two times a day  diazepam    Tablet 5 milliGRAM(s) Oral every 8 hours PRN Muscle spasms  FLUoxetine 40 milliGRAM(s) Oral daily  nortriptyline 50 milliGRAM(s) Oral daily  ondansetron   Disintegrating Tablet 4 milliGRAM(s) Oral every 6 hours PRN Nausea  oxyCODONE    IR 5 milliGRAM(s) Oral every 4 hours PRN Moderate Pain (4 - 6)  oxyCODONE    IR 10 milliGRAM(s) Oral every 4 hours PRN Severe Pain (7 - 10)  pregabalin 100 milliGRAM(s) Oral three times a day    Cardiac:  propranolol LA 60 milliGRAM(s) Oral daily    Pulm:    GI/:  bisacodyl 5 milliGRAM(s) Oral daily PRN Constipation  senna 2 Tablet(s) Oral at bedtime PRN Constipation    Other:   dexamethasone     Tablet 4 milliGRAM(s) Oral every 12 hours  enoxaparin Injectable 40 milliGRAM(s) SubCutaneous at bedtime  influenza   Vaccine 0.5 milliLiter(s) IntraMuscular once  naloxone Injectable 0.1 milliGRAM(s) IV Push every 3 minutes PRN For ANY of the following changes in patient status:  A. RR LESS THAN 10 breaths per minute, B. Oxygen saturation LESS THAN 90%, C. Sedation score of 6  sodium chloride 0.9% with potassium chloride 20 mEq/L 1000 milliLiter(s) IV Continuous <Continuous>    DIET: [] Regular [] CCD [] Renal [] Puree [] Dysphagia [] Tube Feeds: regular     IMAGING: no new imaging

## 2018-02-16 NOTE — DISCHARGE NOTE ADULT - PLAN OF CARE
Increase activity Follow up with Dr. Lowe in 7-10 days. Please call office to confirm appointment-Neurosurgeon   Follow up with PMD in 10 days   Follow up with personal pulmonologist after discharge.

## 2018-02-16 NOTE — DISCHARGE NOTE ADULT - HOSPITAL COURSE
Patient had c3-c7 ACDF  done on 2/14. Post surgery patient was admitted to pacu and was monitored. Patient was given pain meds, ivf and was started back on routine home meds. Patient did not have any complications. Patient's hemovac was removed on 2/16. Patient was seen and cleared by physical therapy for discharge. Patient was discharged home on pain meds, steroids and follow up instructions.

## 2018-02-16 NOTE — PROGRESS NOTE ADULT - PROVIDER SPECIALTY LIST ADULT
Anesthesia
Anesthesia
Cardiology
ENT
Neurosurgery
Neurosurgery
Pain Medicine
Pulmonology
Neurosurgery
Neurosurgery
Pulmonology
Pulmonology

## 2018-02-16 NOTE — DISCHARGE NOTE ADULT - REASON FOR ADMISSION
Patient was admitted on 2/12 with left upper radiculopathy and cervical disc herniation. Patient had c3-c7 anterior cervical discectomy and fusion done on 2/14

## 2018-02-16 NOTE — DISCHARGE NOTE ADULT - CARE PLAN
Principal Discharge DX:	Cervical radiculopathy  Goal:	Increase activity  Assessment and plan of treatment:	Follow up with Dr. Lowe in 7-10 days. Please call office to confirm appointment-Neurosurgeon   Follow up with PMD in 10 days   Follow up with personal pulmonologist after discharge.

## 2018-02-16 NOTE — PROGRESS NOTE ADULT - SUBJECTIVE AND OBJECTIVE BOX
Follow-up Pulm Progress Note    Agitated, c/o not sleeping well  94% on RA    Medications:  MEDICATIONS  (STANDING):  ALPRAZolam 0.25 milliGRAM(s) Oral two times a day  dexamethasone     Tablet 4 milliGRAM(s) Oral every 12 hours  enoxaparin Injectable 40 milliGRAM(s) SubCutaneous at bedtime  FLUoxetine 40 milliGRAM(s) Oral daily  influenza   Vaccine 0.5 milliLiter(s) IntraMuscular once  nortriptyline 50 milliGRAM(s) Oral daily  oxyCODONE  ER Tablet 10 milliGRAM(s) Oral every 12 hours  pregabalin 100 milliGRAM(s) Oral three times a day  propranolol LA 60 milliGRAM(s) Oral daily  sodium chloride 0.9% with potassium chloride 20 mEq/L 1000 milliLiter(s) (40 mL/Hr) IV Continuous <Continuous>    MEDICATIONS  (PRN):  acetaminophen   Tablet. 650 milliGRAM(s) Oral every 6 hours PRN Mild Pain (1 - 3)  bisacodyl 5 milliGRAM(s) Oral daily PRN Constipation  diazepam    Tablet 5 milliGRAM(s) Oral every 8 hours PRN Muscle spasms  naloxone Injectable 0.1 milliGRAM(s) IV Push every 3 minutes PRN For ANY of the following changes in patient status:  A. RR LESS THAN 10 breaths per minute, B. Oxygen saturation LESS THAN 90%, C. Sedation score of 6  ondansetron   Disintegrating Tablet 4 milliGRAM(s) Oral every 6 hours PRN Nausea  oxyCODONE    IR 5 milliGRAM(s) Oral every 4 hours PRN Moderate Pain (4 - 6)  oxyCODONE    IR 10 milliGRAM(s) Oral every 4 hours PRN Severe Pain (7 - 10)  senna 2 Tablet(s) Oral at bedtime PRN Constipation          Vital Signs Last 24 Hrs  T(C): 37 (16 Feb 2018 04:43), Max: 37 (16 Feb 2018 04:43)  T(F): 98.6 (16 Feb 2018 04:43), Max: 98.6 (16 Feb 2018 04:43)  HR: 98 (16 Feb 2018 04:43) (80 - 98)  BP: 132/78 (16 Feb 2018 04:43) (126/81 - 150/86)  BP(mean): --  RR: 18 (16 Feb 2018 04:43) (18 - 18)  SpO2: 93% (16 Feb 2018 04:43) (93% - 95%) on RA          02-15 @ 07:01  -  02-16 @ 07:00  --------------------------------------------------------  IN: 1070 mL / OUT: 25 mL / NET: 1045 mL          LABS:                        11.5   16.81 )-----------( 425      ( 16 Feb 2018 07:29 )             36.1     02-16    142  |  103  |  14  ----------------------------<  81  4.6   |  26  |  0.35<L>    Ca    9.8      16 Feb 2018 07:33  Phos  3.0     02-14  Mg     2.0     02-14            CAPILLARY BLOOD GLUCOSE                MYLES Negative 02-13 @ 22:07  Anti SS-1 --  Anti SS-2 --  Anti RNP --  RF <7.0 02-13 @ 22:07    Atypical ANCA -- 02-13 @ 22:07  c-ANCA titer -- 02-13 @ 22:07  c-ANCA -- 02-13 @ 22:07  p-ANCA -- 02-13 @ 22:07              CULTURES: (if applicable)          Physical Examination:  PULM: Clear to auscultation bilaterally, no significant sputum production  CVS: S1, S2 heard    RADIOLOGY REVIEWED  CT chest 2/13: < from: CT Chest No Cont (02.13.18 @ 14:31) >  CHEST:     LUNGS AND LARGE AIRWAYS: Patent central airways. Sutures are seen along   the apex of the right upper lobe. Diffuse groundglass opacities   predominantly within the upper lobes bilaterally. No pulmonary nodules.  PLEURA: No pleural effusion.  VESSELS: Within normal limits.  HEART: Heart size is normal. No pericardial effusion.  MEDIASTINUM AND KIANA: No lymphadenopathy.  CHEST WALL AND LOWER NECK: Within normal limits.  VISUALIZED UPPER ABDOMEN: Status post cholecystectomy.  BONES: Schmorl's node noted at the inferior endplate of T7 and superior   plate of T11.    IMPRESSION: Bilateral groundglass opacities and minimal peripheral   reticulation with subpleural sparing, with an upper lobe predominance.    Differential diagnosis includes hypersensitivity pneumonitis, early   interstitial fibrosis, or drug reaction. Organizing pneumonia or   posttreatment change can also have such an appearance. Recommend   submission of outside images. An addendum will be should accordingly.    Surgical sutures seen inferiorly right upper lobe consistent with prior   biopsy, please correlate with biopsy results available.    < end of copied text > Follow-up Pulm Progress Note    Agitated and extremely anxious c/o not sleeping well  94% on RA at rest  85% on RA after ambulation     Medications:  MEDICATIONS  (STANDING):  ALPRAZolam 0.25 milliGRAM(s) Oral two times a day  dexamethasone     Tablet 4 milliGRAM(s) Oral every 12 hours  enoxaparin Injectable 40 milliGRAM(s) SubCutaneous at bedtime  FLUoxetine 40 milliGRAM(s) Oral daily  influenza   Vaccine 0.5 milliLiter(s) IntraMuscular once  nortriptyline 50 milliGRAM(s) Oral daily  oxyCODONE  ER Tablet 10 milliGRAM(s) Oral every 12 hours  pregabalin 100 milliGRAM(s) Oral three times a day  propranolol LA 60 milliGRAM(s) Oral daily  sodium chloride 0.9% with potassium chloride 20 mEq/L 1000 milliLiter(s) (40 mL/Hr) IV Continuous <Continuous>    MEDICATIONS  (PRN):  acetaminophen   Tablet. 650 milliGRAM(s) Oral every 6 hours PRN Mild Pain (1 - 3)  bisacodyl 5 milliGRAM(s) Oral daily PRN Constipation  diazepam    Tablet 5 milliGRAM(s) Oral every 8 hours PRN Muscle spasms  naloxone Injectable 0.1 milliGRAM(s) IV Push every 3 minutes PRN For ANY of the following changes in patient status:  A. RR LESS THAN 10 breaths per minute, B. Oxygen saturation LESS THAN 90%, C. Sedation score of 6  ondansetron   Disintegrating Tablet 4 milliGRAM(s) Oral every 6 hours PRN Nausea  oxyCODONE    IR 5 milliGRAM(s) Oral every 4 hours PRN Moderate Pain (4 - 6)  oxyCODONE    IR 10 milliGRAM(s) Oral every 4 hours PRN Severe Pain (7 - 10)  senna 2 Tablet(s) Oral at bedtime PRN Constipation          Vital Signs Last 24 Hrs  T(C): 37 (16 Feb 2018 04:43), Max: 37 (16 Feb 2018 04:43)  T(F): 98.6 (16 Feb 2018 04:43), Max: 98.6 (16 Feb 2018 04:43)  HR: 98 (16 Feb 2018 04:43) (80 - 98)  BP: 132/78 (16 Feb 2018 04:43) (126/81 - 150/86)  BP(mean): --  RR: 18 (16 Feb 2018 04:43) (18 - 18)  SpO2: 93% (16 Feb 2018 04:43) (93% - 95%) on RA          02-15 @ 07:01  -  02-16 @ 07:00  --------------------------------------------------------  IN: 1070 mL / OUT: 25 mL / NET: 1045 mL          LABS:                        11.5   16.81 )-----------( 425      ( 16 Feb 2018 07:29 )             36.1     02-16    142  |  103  |  14  ----------------------------<  81  4.6   |  26  |  0.35<L>    Ca    9.8      16 Feb 2018 07:33  Phos  3.0     02-14  Mg     2.0     02-14            CAPILLARY BLOOD GLUCOSE                MYLES Negative 02-13 @ 22:07  Anti SS-1 --  Anti SS-2 --  Anti RNP --  RF <7.0 02-13 @ 22:07    Atypical ANCA -- 02-13 @ 22:07  c-ANCA titer -- 02-13 @ 22:07  c-ANCA -- 02-13 @ 22:07  p-ANCA -- 02-13 @ 22:07              CULTURES: (if applicable)          Physical Examination:  PULM: Clear to auscultation bilaterally, no significant sputum production  CVS: S1, S2 heard    RADIOLOGY REVIEWED  CT chest 2/13: < from: CT Chest No Cont (02.13.18 @ 14:31) >  CHEST:     LUNGS AND LARGE AIRWAYS: Patent central airways. Sutures are seen along   the apex of the right upper lobe. Diffuse groundglass opacities   predominantly within the upper lobes bilaterally. No pulmonary nodules.  PLEURA: No pleural effusion.  VESSELS: Within normal limits.  HEART: Heart size is normal. No pericardial effusion.  MEDIASTINUM AND KIANA: No lymphadenopathy.  CHEST WALL AND LOWER NECK: Within normal limits.  VISUALIZED UPPER ABDOMEN: Status post cholecystectomy.  BONES: Schmorl's node noted at the inferior endplate of T7 and superior   plate of T11.    IMPRESSION: Bilateral groundglass opacities and minimal peripheral   reticulation with subpleural sparing, with an upper lobe predominance.    Differential diagnosis includes hypersensitivity pneumonitis, early   interstitial fibrosis, or drug reaction. Organizing pneumonia or   posttreatment change can also have such an appearance. Recommend   submission of outside images. An addendum will be should accordingly.    Surgical sutures seen inferiorly right upper lobe consistent with prior   biopsy, please correlate with biopsy results available.    < end of copied text >

## 2018-02-16 NOTE — DISCHARGE NOTE ADULT - MEDICATION SUMMARY - MEDICATIONS TO TAKE
I will START or STAY ON the medications listed below when I get home from the hospital:    Outpatient physical therapy   -- 3 times a wek for 4 weeks.  startr after office visit with neurosurgeon   -- Indication: For Physical therapy    dexamethasone 2 mg oral tablet  -- 2 tab(s) by mouth 2 times a day  for one day then one tab two times a day for one day then continue on home dose prednisone after that   -- It is very important that you take or use this exactly as directed.  Do not skip doses or discontinue unless directed by your doctor.  Obtain medical advice before taking any non-prescription drugs as some may affect the action of this medication.  Take with food or milk.    -- Indication: For steroid     oxyCODONE 5 mg oral tablet  -- 1 tab(s) by mouth every 4 hours, As needed, Moderate Pain (4 - 6) MDD:6  -- Indication: For Pain    propranolol  -- Indication: For blood pressure     propranolol 60 mg oral capsule, extended release  -- 1 cap(s) by mouth once a day  -- Indication: For blood pressure     Lyrica  -- 100 milligram(s) by mouth 3 times a day  -- Indication: For Paiin    nortriptyline  -- 40 milligram(s) by mouth once a day  -- Indication: For Mood     PROzac 40 mg oral capsule  -- 1 cap(s) by mouth once a day  -- Indication: For Mood     Xanax 0.25 mg oral tablet  -- 1 cap(s) by mouth 2 times a day  -- Indication: For Mood     senna oral tablet  -- 2 tab(s) by mouth once a day (at bedtime), As needed, Constipation  -- Indication: For stool softener

## 2018-02-16 NOTE — PROGRESS NOTE ADULT - PROBLEM SELECTOR PROBLEM 1
Pulmonary fibrosis
Cervical radiculopathy
Pulmonary fibrosis
Pulmonary fibrosis

## 2018-02-16 NOTE — PROGRESS NOTE ADULT - PROBLEM SELECTOR PLAN 1
Bilateral GGO with mild peripheral reticulation, upper lobe predominant likely represent early fibrosis as sequelae of  or ARDS   -s/p stress dose hydrocortisone   -Now on Decadron 4mg q12 per NSx  -Resume Prednisone 20mg qd (home dose) when decadron completed  -Extubated on 2/14 in OR, no post op respiratory complications  -Check sp02 with ambulation today prior to discharge Bilateral GGO with mild peripheral reticulation, upper lobe predominant likely represent early fibrosis as sequelae of  or ARDS   -s/p stress dose hydrocortisone   -Now on Decadron 4mg q12 per NSx  -Resume Prednisone 20mg qd (home dose) when decadron completed  -Extubated on 2/14 in OR, no post op respiratory complications  -94% on RA at rest, 85% on RA with ambulation today, unclear what her baseline sp02 is with ambulation   -Check CXR

## 2018-02-16 NOTE — CHART NOTE - NSCHARTNOTEFT_GEN_A_CORE
Pain Management Attending Addendum    SUBJECTIVE: Pt is doing well, pain controlled. Will dc IV PCA and transition to oral analgesics.    Therapy:    [X] IV PCA         [ ] PRN Analgesics    OBJECTIVE:   [X] Pain appropriately controlled    [ ] Other:    Side Effects:  [X] None	             [ ] Nausea              [ ] Pruritis                	[ ] Other:    ASSESSMENT/PLAN:  Therapy changed to PRN analgesics

## 2018-02-22 PROBLEM — J84.116 CRYPTOGENIC ORGANIZING PNEUMONIA: Chronic | Status: ACTIVE | Noted: 2018-02-12

## 2018-02-22 PROBLEM — M50.20 OTHER CERVICAL DISC DISPLACEMENT, UNSPECIFIED CERVICAL REGION: Chronic | Status: ACTIVE | Noted: 2018-02-12

## 2018-02-23 ENCOUNTER — APPOINTMENT (OUTPATIENT)
Dept: SPINE | Facility: CLINIC | Age: 48
End: 2018-02-23
Payer: OTHER MISCELLANEOUS

## 2018-02-23 VITALS
HEIGHT: 61 IN | SYSTOLIC BLOOD PRESSURE: 136 MMHG | DIASTOLIC BLOOD PRESSURE: 90 MMHG | WEIGHT: 160 LBS | BODY MASS INDEX: 30.21 KG/M2 | HEART RATE: 79 BPM

## 2018-02-23 PROCEDURE — 99024 POSTOP FOLLOW-UP VISIT: CPT

## 2018-03-29 ENCOUNTER — APPOINTMENT (OUTPATIENT)
Dept: SPINE | Facility: CLINIC | Age: 48
End: 2018-03-29
Payer: OTHER MISCELLANEOUS

## 2018-03-29 VITALS
HEIGHT: 61 IN | BODY MASS INDEX: 30.21 KG/M2 | SYSTOLIC BLOOD PRESSURE: 130 MMHG | DIASTOLIC BLOOD PRESSURE: 74 MMHG | WEIGHT: 160 LBS

## 2018-03-29 PROCEDURE — 99024 POSTOP FOLLOW-UP VISIT: CPT

## 2018-03-30 ENCOUNTER — TRANSCRIPTION ENCOUNTER (OUTPATIENT)
Age: 48
End: 2018-03-30

## 2018-04-26 ENCOUNTER — APPOINTMENT (OUTPATIENT)
Dept: SPINE | Facility: CLINIC | Age: 48
End: 2018-04-26
Payer: OTHER MISCELLANEOUS

## 2018-04-26 VITALS
WEIGHT: 160 LBS | SYSTOLIC BLOOD PRESSURE: 132 MMHG | BODY MASS INDEX: 30.21 KG/M2 | HEIGHT: 61 IN | DIASTOLIC BLOOD PRESSURE: 70 MMHG

## 2018-04-26 PROCEDURE — 99024 POSTOP FOLLOW-UP VISIT: CPT

## 2018-07-12 ENCOUNTER — APPOINTMENT (OUTPATIENT)
Dept: SPINE | Facility: CLINIC | Age: 48
End: 2018-07-12
Payer: OTHER MISCELLANEOUS

## 2018-07-12 VITALS
HEIGHT: 61 IN | DIASTOLIC BLOOD PRESSURE: 74 MMHG | BODY MASS INDEX: 30.21 KG/M2 | SYSTOLIC BLOOD PRESSURE: 126 MMHG | WEIGHT: 160 LBS

## 2018-07-12 DIAGNOSIS — Z98.890 OTHER SPECIFIED POSTPROCEDURAL STATES: ICD-10-CM

## 2018-07-12 PROCEDURE — 99213 OFFICE O/P EST LOW 20 MIN: CPT

## 2018-07-12 RX ORDER — ALPRAZOLAM 0.25 MG/1
0.25 TABLET ORAL
Refills: 0 | Status: DISCONTINUED | COMMUNITY
End: 2018-07-12

## 2018-07-12 RX ORDER — CARISOPRODOL 350 MG/1
350 TABLET ORAL
Refills: 0 | Status: DISCONTINUED | COMMUNITY
End: 2018-07-12

## 2018-07-12 RX ORDER — PREDNISONE 20 MG/1
20 TABLET ORAL
Refills: 0 | Status: DISCONTINUED | COMMUNITY
End: 2018-07-12

## 2018-07-12 RX ORDER — PREDNISONE 10 MG/1
10 TABLET ORAL
Refills: 0 | Status: ACTIVE | COMMUNITY

## 2018-11-14 ENCOUNTER — OTHER (OUTPATIENT)
Age: 48
End: 2018-11-14

## 2018-11-15 ENCOUNTER — APPOINTMENT (OUTPATIENT)
Dept: SPINE | Facility: CLINIC | Age: 48
End: 2018-11-15

## 2018-11-26 NOTE — CONSULT NOTE ADULT - PROBLEM SELECTOR RECOMMENDATION 5
Lovenox per NSGY
I have personally seen and examined this patient.  I have fully participated in the care of this patient. I have reviewed all pertinent clinical information, including history, physical exam, plan and the Resident’s note and agree except as noted.

## 2021-05-31 NOTE — CONSULT NOTE ADULT - PROVIDER SPECIALTY LIST ADULT
Cardiology
ENT
Pulmonology
Hospitalist
no loss of consciousness, no gait abnormality, no headache, no sensory deficits, and no weakness.

## 2022-04-02 NOTE — CONSULT NOTE ADULT - ASSESSMENT
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

## 2022-06-07 NOTE — DISCHARGE NOTE ADULT - CARE PROVIDER_API CALL
declines Carlos Lowe (MD), Neurological Surgery  [address] [place] [address] [address]  [place] [address]  Phone: [phone]  Fax: [phone]

## 2023-03-21 NOTE — PHYSICAL THERAPY INITIAL EVALUATION ADULT - STANDING BALANCE: STATIC
Justo Zendejas  is a 62 y.o. y/o female that presents for Follow-up (Spots on both arms started awhile ago, getting worse )      Rash    HPI:    Length of time Sx have been present - 4 day(s). Rash has gotten worse since initially starting  Affected areas - bilateral lower arms  Inciting events or exposures prior to rash starting? Yes - she works at [employer] and thinks she may be allergic to the cardboard boxes and plastic she comes into contact with. Did have a rash like this in the past and use d steroids to clear it up She has arvin using otc hydrocortisone cream which helps a little bit but then the itching comes back. Pruritic? Yes  Erythematous? Yes  Weeping or drainage? No  History of Urticaria? No  Fever? No  Painful? No        Diabetes Type 2    Glucose control:   Does patient check blood glucoses at home? No  Report of hypoglycemia: no  Lab Results   Component Value Date    LABA1C 6.1 (H) 02/09/2023     No results found for: EAG    Symptoms  Polyuria, Polydipsia or Polyphagia? No  Chest Pain, SOB, or Palpitations? -  No  New Vision complaints? No  Paresthesias of the extremities? No    Medications  Current medication were reviewed. Compliant with medications? yes  Medication side effects? No  On ACE-I or ARB? Yes  On antiplatelet therapy? No  On Statin? No    Last Diabetic Eye Exam: unknown    Exercise  Exercise? No  Wt Readings from Last 3 Encounters:   03/21/23 177 lb 6.4 oz (80.5 kg)   02/09/23 170 lb 12.8 oz (77.5 kg)   11/21/22 173 lb 8 oz (78.7 kg)       Diet discipline?:  Low salt, fat, sugar diet?   yes    Blood pressure control:  BP Readings from Last 3 Encounters:   03/21/23 132/74   02/09/23 130/68   11/21/22 130/68       Lab Results   Component Value Date    LABMICR < 1.20 02/08/2022       Lab Results   Component Value Date    LDLCALC 81 09/19/2022          OBJECTIVE:  /74   Pulse 87   Temp 97.7 °F (36.5 °C)   Resp 16   Ht 5' 2.5\" (1.588 m)   Wt 177 lb 6.4 oz (80.5 kg)
Medication(s) given during visit:    Administrations This Visit       methylPREDNISolone acetate (DEPO-MEDROL) injection 80 mg       Admin Date  03/21/2023  12:02 Action  Given Dose  80 mg Route  IntraMUSCular Site  Dorsogluteal Right Administered By  Fabian Azar LPN    Ordering Provider: NINA Mcdermott CNP    NDC: [address]-4    Lot#: [id number]    : Kaya Roman    Patient Supplied?: No                    Patient instructed to remain in clinic for 20 minutes after injection and was advised to report any adverse reaction to me immediately.
normal balance

## 2023-03-27 NOTE — DISCHARGE NOTE ADULT - IF YOU ARE A SMOKER, IT IS IMPORTANT FOR YOUR HEALTH TO STOP SMOKING. PLEASE BE AWARE THAT SECOND HAND SMOKE IS ALSO HARMFUL.
Statement Selected

## 2023-06-15 NOTE — DISCHARGE NOTE ADULT - ADDITIONAL INSTRUCTIONS
[facility]                                     Progress Note    Patient Name: Kiesha Mcguire  Date: 6/15/2023       Service Type: Individual      Session Start Time: 10:03 AM Session End Time: 10:51 AM     Session Length: 38-52 minutes    Session #: 33 with this provider    Attendees: Client attended alone    Service Modality:  Video Visit:      Provider verified identity through the following two step process.  Patient provided:  Patient is known previously to provider    Telemedicine Visit: The patient's condition can be safely assessed and treated via synchronous audio and visual telemedicine encounter.      Reason for Telemedicine Visit: Patient convenience (e.g. access to timely appointments / distance to available provider) and Services only offered telehealth    Originating Site (Patient Location): Patient's home    Distant Site (Provider Location): Provider Remote Setting- Home Office/Off-site    Consent:  The patient/guardian has verbally consented to: the potential risks and benefits of telemedicine (video visit) versus in person care; bill my insurance or make self-payment for services provided; and responsibility for payment of non-covered services.     Patient would like the video invitation sent by:  [employer]    Mode of Communication:  Video Conference via [employer]    As the provider I attest to compliance with applicable laws and regulations related to telemedicine.    DATA  Interactive Complexity: No   Crisis: No      Progress Since Last Session (Related to Symptoms / Goals / Homework):  Symptoms: no change since previous appointment    Homework: Achieved / completed to satisfaction      Episode of Care Goals: Satisfactory progress - ACTION (Actively working towards change); Intervened by reinforcing change plan / affirming steps taken     Current / Ongoing Stressors and Concerns:   Grief related to past and impact on herself and her children   Difficulty trusting others and  "being vulnerable   Desire for more intimate relationships   Fear of being rejected by others; belief she is not good enough   Lack of trust with providers  Dynamics in relationship with ex-  Lack of support system  Son's health      Treatment Objective(s) Addressed in This Session:   identify 2 fears / thoughts that contribute to feeling anxious  Identify negative self-talk and behaviors: challenge core beliefs, myths, and actions   Identify a minimum of 1 strategy to manage symptoms  Use assertive communication    Safe/calm state titled \"calm\"  Container: box with a lock    Potential Targets:   Relationship with ex-  Guilt about how divorce ended, Negative Belief \"It's my fault\"  Guilt about son's surgery and outcome  Negative Belief: There is something wrong with me  Being blamed by ex-boyfriend, feel shame  Childhood: not belonging, not being good enough      Current Potential Targets  Absence of friendships  Not fitting in  Defensiveness  Ex on social media  Driving in certain areas will remind her of her ex  Relationship with ex-    Past:   image of child self at 10 years old   Disagreement about salad   [facility]   head injury    Trauma symptoms: avoids places in neighborhood for fear of running into ex-boyfriend  Has avoided romantic relationships since  Dissociative symptoms while in relationship  Recurrent, intrusive, distressing memories  Problems with concentration     Intervention:   CBT: identified feelings, cognitions, and behaviors, reinforced behavior changes   Solution focused: exception questions   Motivational interviewing: expressed empathy/understanding, use of reflective listening and open ended questions, supported autonomy   Supported client with recent stressors     Assessments completed prior to visit:   The following assessments were completed by patient for this visit:  None    ASSESSMENT: Current Emotional / Mental Status (status of significant " symptoms):   Risk status (Self / Other harm or suicidal ideation)   Patient denies current fears or concerns for personal safety.   Patient denies current or recent suicidal ideation or behaviors.   Patient denies current or recent homicidal ideation or behaviors.   Patient denies current or recent self injurious behavior or ideation.   Patient denies other safety concerns.   Patient reports there has been no change in risk factors since their last session.     Patient reports there has been no change in protective factors since their last session.     Recommended that patient call 911 or go to the local ED should there be a change in any of these risk factors.     Appearance:   Appropriate    Eye Contact:   Good    Psychomotor Behavior: Normal    Attitude:   Cooperative  Interested Open to feedback and support    Orientation:   All   Speech    Rate / Production: Normal/ Responsive Talkative    Volume:  Normal    Mood:    Down-minimal Anxious   Affect:    Tearful -minimal, appropriate   Thought Content:  Clear  Rumination    Thought Form:  Coherent    Insight:    Good      Medication Review:   No changes to current psychiatric medication(s)   Vyvanse      Medication Compliance:   Yes     Changes in Health Issues:   None reported     Chemical Use Review:   Substance Use: no use     Tobacco Use: no use    Diagnosis:   Unspecified Depressive Disorder     Other Specified Trauma and Stress Related Disorder          Generalized Anxiety Disorder, Provisional    Collateral Reports Completed:   Not Applicable    PLAN: (Patient Tasks / Therapist Tasks / Other)  EMDR: reevaluate target: fear of seeing ex-boyfriend at a concert.  Plan for next session to have client keep her eyes open.  Target negative belief: I cannot trust anyone.  Consider next target to be ex-.  Therapist to consider engaging client in conference room exercise.  Client is reading the following books; Co-regulation handbook, pathological demand  avoidance, and Declarative language    Magali Rodrickmelquiades, [facility] 6/15/2023    ______________________________________________________________________    Individual Treatment Plan    Patient's Name: Kiesha Mcguire  YOB: 1968    Date of Creation: 5/11/2022  Date Treatment Plan Last Reviewed/Revised: 3/29/2023    DSM5 Diagnoses:   1. Unspecified Depressive Disorder     Other Specified Trauma and Stress Related Disorder   Generalized Anxiety Disorder, Provisional    Psychosocial / Contextual Factors: stressors related to parenting  PROMIS (reviewed every 90 days):  31    Referral / Collaboration:  Referral to another professional/service is not indicated at this time..    Anticipated number of session for this episode of care: will review in 90 days  Anticipation frequency of session: Every other week  Anticipated Duration of each session: 38-52 minutes  Treatment plan will be reviewed in 90 days or when goals have been changed.       MeasurableTreatment Goal(s) related to diagnosis / functional impairment(s)  Goal 1: Patient will sustain attention and concentration for consistently longer periods of time.  Patient will meet goals set for completing tasks 80% of the time.   Client's Goal:  I will know I've met my goal when my space isn't so chaotic, meeting deadlines around bills and work, when I am not always playing catch-up, completing tasks.      Objective #A (Patient Action)    Patient will learn and implement organization and planning skills.  Status: New - Date: 5/11/2022, continued date: 9/8/2022, continued date: 12/21/2022, completed date: 3/29/2023    Intervention(s)  Therapist will teach the client organization and planning skills.  Therapist will address any potential barriers to using skills.    Objective #B  Patient will identify, challenge, and change self-talk that contributes to maladaptive feelings and actions.  Status: New - Date: 5/11/2022, Continued date: 9/8/2022, continued date:  "12/21/2022, continued date: 3/29/2023    Intervention(s)  Therapist will teach the CBT model, cognitive distortions, and cognitive restructuring techniques.      Goal 2: Patient will be able to recall the traumatic events without becoming overwhelmed with negative thoughts, feelings, or urges.   Client's Goal:  I will know I've met my goal when I do not cry every time I talk about it.\"    Objective #A (Patient Action)    Status: New - Date: 5/11/2022, continued date: 9/8/2022, continued date: 12/21/2022, continued date: 3/29/2023    Patient will describe the history of and nature of trauma symptoms    Intervention(s)  Therapist will assess the client's frequency, intensity, duration, and history of trauma symptoms and their impact on functioning.    Objective #B (Patient Action)  Status: New Date: 5/11/2022, continued date: 9/8/2022, continued date: 12/21/2022, continued date: 3/29/2023    Patient will cooperate with eye movement desensitization and reprocessing (EMDR) to reduce emotional reaction to traumatic event(s)    Intervention(s)  Therapist will utilize EMDR to reduce the client's emotional reactivity to the traumatic event(s).    Objective #C (Patient Action)  Status: New Date: 5/11/2022, continued date: 9/8/2022, continued date: 12/21/2022, continued date: 3/29/2023    Patient will learn and implement calming and coping strategies to manage trauma symptoms.    Intervention(s)  Therapist will teach grounding techniques, distress tolerance, and emotion regulation techniques.      Patient has reviewed and agreed to the above plan.      Magali Montilla, [facility]  May 11, 2022  Magali Montilla [facility]  9/8/2022  Magali Montilla [facility]  12/21/2022  Magali Montilla [facility]  3/29/2023  " May take shower 4 days after surgery. Let water run over the surgical site and pat dry after shower. May take shower 4 days after surgery. Let water run over the surgical site and pat dry after shower.  Please remove surgical site dressing in am . Leave the sterri strips in place.   If notices any new weakness, numbness, tingling , severe pain or fever then please come back to hospital. May take shower 4 days after surgery. Let water run over the surgical site and pat dry after shower.  Please remove surgical site dressing in am . Leave the sterri strips in place.   If notices any new weakness, numbness, tingling , severe pain or fever then please come back to hospital.  start home prednisone once decadron is finished

## 2024-04-09 NOTE — ED PROVIDER NOTE - CADM POA CENTRAL LINE
[Potential consequences of obesity discussed] : Potential consequences of obesity discussed [Benefits of weight loss discussed] : Benefits of weight loss discussed [Structured Weight Management Program suggested:] : Structured weight management program suggested [Encouraged to increase physical activity] : Encouraged to increase physical activity [Good understanding] : Patient has a good understanding of disease, goals and obesity follow-up plan [[email or web address]] : 25 No
